# Patient Record
Sex: FEMALE | Race: WHITE | Employment: FULL TIME | URBAN - METROPOLITAN AREA
[De-identification: names, ages, dates, MRNs, and addresses within clinical notes are randomized per-mention and may not be internally consistent; named-entity substitution may affect disease eponyms.]

---

## 2017-05-31 ENCOUNTER — HOSPITAL ENCOUNTER (OUTPATIENT)
Dept: MAMMOGRAPHY | Age: 47
Discharge: HOME | End: 2017-05-31
Payer: COMMERCIAL

## 2017-05-31 DIAGNOSIS — Z12.31 VISIT FOR SCREENING MAMMOGRAM: ICD-10-CM

## 2017-05-31 PROCEDURE — 77067 SCR MAMMO BI INCL CAD: CPT

## 2019-06-03 ENCOUNTER — HOSPITAL ENCOUNTER (OUTPATIENT)
Dept: MAMMOGRAPHY | Age: 49
Discharge: HOME | End: 2019-06-03
Attending: INTERNAL MEDICINE
Payer: COMMERCIAL

## 2019-06-03 DIAGNOSIS — Z12.31 VISIT FOR SCREENING MAMMOGRAM: ICD-10-CM

## 2019-06-03 PROCEDURE — 77067 SCR MAMMO BI INCL CAD: CPT

## 2020-06-07 ENCOUNTER — APPOINTMENT (OUTPATIENT)
Dept: CT IMAGING | Age: 50
DRG: 066 | End: 2020-06-07
Attending: EMERGENCY MEDICINE
Payer: COMMERCIAL

## 2020-06-07 ENCOUNTER — APPOINTMENT (OUTPATIENT)
Dept: MRI IMAGING | Age: 50
DRG: 066 | End: 2020-06-07
Attending: FAMILY MEDICINE
Payer: COMMERCIAL

## 2020-06-07 ENCOUNTER — HOSPITAL ENCOUNTER (INPATIENT)
Age: 50
LOS: 1 days | Discharge: HOME | DRG: 066 | End: 2020-06-08
Attending: EMERGENCY MEDICINE | Admitting: FAMILY MEDICINE
Payer: COMMERCIAL

## 2020-06-07 DIAGNOSIS — R47.01 APHASIA: ICD-10-CM

## 2020-06-07 DIAGNOSIS — I63.9 CEREBROVASCULAR ACCIDENT (CVA), UNSPECIFIED MECHANISM (HCC): Primary | ICD-10-CM

## 2020-06-07 PROBLEM — I63.441 EMBOLIC STROKE INVOLVING RIGHT CEREBELLAR ARTERY (HCC): Status: ACTIVE | Noted: 2020-06-07

## 2020-06-07 PROBLEM — F41.1 GAD (GENERALIZED ANXIETY DISORDER): Status: ACTIVE | Noted: 2020-06-07

## 2020-06-07 PROBLEM — E78.5 HLD (HYPERLIPIDEMIA): Status: ACTIVE | Noted: 2020-06-07

## 2020-06-07 PROBLEM — F32.9 MDD (MAJOR DEPRESSIVE DISORDER): Status: ACTIVE | Noted: 2020-06-07

## 2020-06-07 PROBLEM — I10 HTN (HYPERTENSION): Status: ACTIVE | Noted: 2020-06-07

## 2020-06-07 PROBLEM — E87.6 HYPOKALEMIA: Status: ACTIVE | Noted: 2020-06-07

## 2020-06-07 LAB
ALBUMIN SERPL-MCNC: 3.7 G/DL (ref 3.5–5)
ALBUMIN/GLOB SERPL: 0.9 {RATIO} (ref 1.2–3.5)
ALP SERPL-CCNC: 86 U/L (ref 50–130)
ALT SERPL-CCNC: 26 U/L (ref 12–65)
ANION GAP SERPL CALC-SCNC: 8 MMOL/L (ref 7–16)
AST SERPL-CCNC: 22 U/L (ref 15–37)
BASOPHILS # BLD: 0.1 K/UL (ref 0–0.2)
BASOPHILS NFR BLD: 1 % (ref 0–2)
BILIRUB SERPL-MCNC: 0.8 MG/DL (ref 0.2–1.1)
BUN SERPL-MCNC: 14 MG/DL (ref 6–23)
CALCIUM SERPL-MCNC: 9.1 MG/DL (ref 8.3–10.4)
CHLORIDE SERPL-SCNC: 106 MMOL/L (ref 98–107)
CO2 SERPL-SCNC: 24 MMOL/L (ref 21–32)
CREAT SERPL-MCNC: 0.78 MG/DL (ref 0.6–1)
DIFFERENTIAL METHOD BLD: ABNORMAL
EOSINOPHIL # BLD: 0 K/UL (ref 0–0.8)
EOSINOPHIL NFR BLD: 1 % (ref 0.5–7.8)
ERYTHROCYTE [DISTWIDTH] IN BLOOD BY AUTOMATED COUNT: 12.9 % (ref 11.9–14.6)
GLOBULIN SER CALC-MCNC: 3.9 G/DL (ref 2.3–3.5)
GLUCOSE BLD STRIP.AUTO-MCNC: 119 MG/DL (ref 65–100)
GLUCOSE SERPL-MCNC: 123 MG/DL (ref 65–100)
HCT VFR BLD AUTO: 47.5 % (ref 35.8–46.3)
HGB BLD-MCNC: 15.9 G/DL (ref 11.7–15.4)
IMM GRANULOCYTES # BLD AUTO: 0 K/UL (ref 0–0.5)
IMM GRANULOCYTES NFR BLD AUTO: 0 % (ref 0–5)
INR BLD: 1.1 (ref 0.9–1.2)
INR PPP: 1
LYMPHOCYTES # BLD: 1.6 K/UL (ref 0.5–4.6)
LYMPHOCYTES NFR BLD: 18 % (ref 13–44)
MAGNESIUM SERPL-MCNC: 2 MG/DL (ref 1.8–2.4)
MCH RBC QN AUTO: 32.9 PG (ref 26.1–32.9)
MCHC RBC AUTO-ENTMCNC: 33.5 G/DL (ref 31.4–35)
MCV RBC AUTO: 98.3 FL (ref 79.6–97.8)
MONOCYTES # BLD: 0.8 K/UL (ref 0.1–1.3)
MONOCYTES NFR BLD: 9 % (ref 4–12)
NEUTS SEG # BLD: 6.2 K/UL (ref 1.7–8.2)
NEUTS SEG NFR BLD: 72 % (ref 43–78)
NRBC # BLD: 0 K/UL (ref 0–0.2)
PLATELET # BLD AUTO: 229 K/UL (ref 150–450)
PMV BLD AUTO: 9.3 FL (ref 9.4–12.3)
POTASSIUM SERPL-SCNC: 3.3 MMOL/L (ref 3.5–5.1)
PROT SERPL-MCNC: 7.6 G/DL (ref 6.3–8.2)
PROTHROMBIN TIME: 13.3 SEC (ref 12–14.7)
PT BLD: 13.5 SECS (ref 9.6–11.6)
RBC # BLD AUTO: 4.83 M/UL (ref 4.05–5.2)
SODIUM SERPL-SCNC: 138 MMOL/L (ref 136–145)
TROPONIN-HIGH SENSITIVITY: 8.3 PG/ML (ref 0–14)
WBC # BLD AUTO: 8.7 K/UL (ref 4.3–11.1)

## 2020-06-07 PROCEDURE — 0042T CT PERF W CBF: CPT

## 2020-06-07 PROCEDURE — 74011250637 HC RX REV CODE- 250/637: Performed by: FAMILY MEDICINE

## 2020-06-07 PROCEDURE — 70496 CT ANGIOGRAPHY HEAD: CPT

## 2020-06-07 PROCEDURE — 84484 ASSAY OF TROPONIN QUANT: CPT

## 2020-06-07 PROCEDURE — 99285 EMERGENCY DEPT VISIT HI MDM: CPT

## 2020-06-07 PROCEDURE — 65270000029 HC RM PRIVATE

## 2020-06-07 PROCEDURE — 85610 PROTHROMBIN TIME: CPT

## 2020-06-07 PROCEDURE — 82962 GLUCOSE BLOOD TEST: CPT

## 2020-06-07 PROCEDURE — 93005 ELECTROCARDIOGRAM TRACING: CPT | Performed by: EMERGENCY MEDICINE

## 2020-06-07 PROCEDURE — 85025 COMPLETE CBC W/AUTO DIFF WBC: CPT

## 2020-06-07 PROCEDURE — 74011250637 HC RX REV CODE- 250/637: Performed by: EMERGENCY MEDICINE

## 2020-06-07 PROCEDURE — 70450 CT HEAD/BRAIN W/O DYE: CPT

## 2020-06-07 PROCEDURE — 83735 ASSAY OF MAGNESIUM: CPT

## 2020-06-07 PROCEDURE — 70551 MRI BRAIN STEM W/O DYE: CPT

## 2020-06-07 PROCEDURE — 94762 N-INVAS EAR/PLS OXIMTRY CONT: CPT

## 2020-06-07 PROCEDURE — 74011250636 HC RX REV CODE- 250/636: Performed by: FAMILY MEDICINE

## 2020-06-07 PROCEDURE — 74011000258 HC RX REV CODE- 258

## 2020-06-07 PROCEDURE — 74011000302 HC RX REV CODE- 302: Performed by: FAMILY MEDICINE

## 2020-06-07 PROCEDURE — 74011636320 HC RX REV CODE- 636/320

## 2020-06-07 PROCEDURE — 80053 COMPREHEN METABOLIC PANEL: CPT

## 2020-06-07 PROCEDURE — 86580 TB INTRADERMAL TEST: CPT | Performed by: FAMILY MEDICINE

## 2020-06-07 RX ORDER — SODIUM CHLORIDE 0.9 % (FLUSH) 0.9 %
10 SYRINGE (ML) INJECTION
Status: ACTIVE | OUTPATIENT
Start: 2020-06-07 | End: 2020-06-07

## 2020-06-07 RX ORDER — SODIUM CHLORIDE 0.9 % (FLUSH) 0.9 %
5-40 SYRINGE (ML) INJECTION AS NEEDED
Status: DISCONTINUED | OUTPATIENT
Start: 2020-06-07 | End: 2020-06-08 | Stop reason: HOSPADM

## 2020-06-07 RX ORDER — ENOXAPARIN SODIUM 100 MG/ML
40 INJECTION SUBCUTANEOUS EVERY 24 HOURS
Status: DISCONTINUED | OUTPATIENT
Start: 2020-06-07 | End: 2020-06-08 | Stop reason: HOSPADM

## 2020-06-07 RX ORDER — GUAIFENESIN 100 MG/5ML
324 LIQUID (ML) ORAL
Status: COMPLETED | OUTPATIENT
Start: 2020-06-07 | End: 2020-06-07

## 2020-06-07 RX ORDER — CLONAZEPAM 0.5 MG/1
0.25 TABLET ORAL DAILY PRN
Status: DISCONTINUED | OUTPATIENT
Start: 2020-06-07 | End: 2020-06-08 | Stop reason: HOSPADM

## 2020-06-07 RX ORDER — CLOPIDOGREL BISULFATE 75 MG/1
300 TABLET ORAL ONCE
Status: COMPLETED | OUTPATIENT
Start: 2020-06-07 | End: 2020-06-07

## 2020-06-07 RX ORDER — SODIUM CHLORIDE 0.9 % (FLUSH) 0.9 %
5-40 SYRINGE (ML) INJECTION EVERY 8 HOURS
Status: DISCONTINUED | OUTPATIENT
Start: 2020-06-07 | End: 2020-06-08 | Stop reason: HOSPADM

## 2020-06-07 RX ORDER — CLOPIDOGREL BISULFATE 75 MG/1
300 TABLET ORAL ONCE
Status: CANCELLED | OUTPATIENT
Start: 2020-06-07 | End: 2020-06-07

## 2020-06-07 RX ORDER — SODIUM CHLORIDE, SODIUM LACTATE, POTASSIUM CHLORIDE, CALCIUM CHLORIDE 600; 310; 30; 20 MG/100ML; MG/100ML; MG/100ML; MG/100ML
100 INJECTION, SOLUTION INTRAVENOUS CONTINUOUS
Status: DISCONTINUED | OUTPATIENT
Start: 2020-06-07 | End: 2020-06-08 | Stop reason: HOSPADM

## 2020-06-07 RX ORDER — AMITRIPTYLINE HYDROCHLORIDE 10 MG/1
10 TABLET, FILM COATED ORAL
Status: DISCONTINUED | OUTPATIENT
Start: 2020-06-07 | End: 2020-06-08 | Stop reason: HOSPADM

## 2020-06-07 RX ORDER — ESCITALOPRAM OXALATE 10 MG/1
10 TABLET ORAL DAILY
Status: DISCONTINUED | OUTPATIENT
Start: 2020-06-08 | End: 2020-06-08 | Stop reason: HOSPADM

## 2020-06-07 RX ORDER — ACETAMINOPHEN 650 MG/1
650 SUPPOSITORY RECTAL
Status: DISCONTINUED | OUTPATIENT
Start: 2020-06-07 | End: 2020-06-08 | Stop reason: HOSPADM

## 2020-06-07 RX ORDER — LABETALOL HYDROCHLORIDE 5 MG/ML
5 INJECTION, SOLUTION INTRAVENOUS
Status: DISCONTINUED | OUTPATIENT
Start: 2020-06-07 | End: 2020-06-08 | Stop reason: HOSPADM

## 2020-06-07 RX ORDER — GUAIFENESIN 100 MG/5ML
81 LIQUID (ML) ORAL DAILY
Status: DISCONTINUED | OUTPATIENT
Start: 2020-06-08 | End: 2020-06-08 | Stop reason: HOSPADM

## 2020-06-07 RX ORDER — ACETAMINOPHEN 325 MG/1
650 TABLET ORAL
Status: DISCONTINUED | OUTPATIENT
Start: 2020-06-07 | End: 2020-06-08 | Stop reason: HOSPADM

## 2020-06-07 RX ORDER — CLOPIDOGREL BISULFATE 75 MG/1
75 TABLET ORAL DAILY
Status: DISCONTINUED | OUTPATIENT
Start: 2020-06-08 | End: 2020-06-08 | Stop reason: HOSPADM

## 2020-06-07 RX ORDER — ROSUVASTATIN CALCIUM 20 MG/1
40 TABLET, COATED ORAL
Status: DISCONTINUED | OUTPATIENT
Start: 2020-06-07 | End: 2020-06-08 | Stop reason: HOSPADM

## 2020-06-07 RX ADMIN — ROSUVASTATIN 40 MG: 20 TABLET, FILM COATED ORAL at 21:44

## 2020-06-07 RX ADMIN — TUBERCULIN PURIFIED PROTEIN DERIVATIVE 5 UNITS: 5 INJECTION, SOLUTION INTRADERMAL at 15:53

## 2020-06-07 RX ADMIN — AMITRIPTYLINE HYDROCHLORIDE 10 MG: 10 TABLET, FILM COATED ORAL at 21:44

## 2020-06-07 RX ADMIN — CLOPIDOGREL BISULFATE 300 MG: 75 TABLET ORAL at 12:56

## 2020-06-07 RX ADMIN — Medication 5 ML: at 21:44

## 2020-06-07 RX ADMIN — SODIUM CHLORIDE, SODIUM LACTATE, POTASSIUM CHLORIDE, AND CALCIUM CHLORIDE 100 ML/HR: 600; 310; 30; 20 INJECTION, SOLUTION INTRAVENOUS at 12:30

## 2020-06-07 RX ADMIN — CLONAZEPAM 0.25 MG: 0.5 TABLET ORAL at 15:52

## 2020-06-07 RX ADMIN — ASPIRIN 81 MG 324 MG: 81 TABLET ORAL at 10:14

## 2020-06-07 RX ADMIN — ENOXAPARIN SODIUM 40 MG: 40 INJECTION SUBCUTANEOUS at 12:56

## 2020-06-07 NOTE — PROGRESS NOTES
Called ER and requested RN completion of MRI screening and consent so that STAT MRI may be done as soon as possible.

## 2020-06-07 NOTE — PROGRESS NOTES
Outreach Follow Up Note    Danielle A Deweil was seen and assessed. MEWS Score: 1 (06/07/20 1259)  Vitals:    06/07/20 1030 06/07/20 1100 06/07/20 1131 06/07/20 1259   BP: 122/57 119/68 119/68 132/74   Pulse: (!) 104 97 100 87   Resp: 28 12 20 20   Temp:   98 °F (36.7 °C) 98.5 °F (36.9 °C)   SpO2: 98% 97% 96% 97%   Weight:       Height:             Pain Assessment  Pain Intensity 1: 0 (06/07/20 1131)        Patient Stated Pain Goal: 0      Patient reviewed and discussed with primary nurse. There have been no significant clinical changes since the completion of the last dated Outreach assessment. Will continue to follow up per outreach protocol. Patient is well cared for by primary care nurse who has seen to her needs.       Signed By:   Arsh Jones RN    June 7, 2020 3:49 PM

## 2020-06-07 NOTE — H&P
History of Present Illness:  45-year-old female presenting [facility] emergency department 6/7/2020 with a complaint of difficulty finding words and confusion since approximately noon of 6/6/2020. 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 The etiology of the stroke has not been determined. Patient has had a loop recorder for approximately 1 year without atrial fibrillation or atrial flutter. Patient had a transesophageal echocardiogram in July 2019 at [address] [place] [address] that showed no PFO or thrombus in left atrial appendage. Patient completed a course of 21 days of dual antiplatelet therapy after the stroke in 2019. Patient has been on home aspirin 81 mg and atorvastatin 80 mg and states that she has been compliant with these medications. Patient denies headache, fever, chills, nausea, vomiting, diarrhea, chest pain, shortness of breath. In the emergency department the patient was noted to have normal vital signs. Patient was profoundly aphasic and stat tele-neuro consult was ordered. Patient was out of window for TPA. Interventional neurosurgery was contacted for possible thrombectomy. Interventional neurosurgeon recommended that the patient had a small distal left MCA occlusion that was not amenable to intervention. CT head unremarkable. CT angiogram head and neck read by radiologist as having no occlusive disease. CT perfusion scan indicated a small area of delayed perfusion in the left posterior frontal lobe with no core infarction. MRI brain showing acute left parietal stroke. The patient was given aspirin 325 mg in the emergency department. Comprehensive review of systems negative unless stated above.     Past Medical History:  Right superior cerebellar CVA July 2019  HTN  HLD  MDD  MICKY    Past Surgical History:  Breast augmentation  Laparoscopic gynecologic surgery    Family History:  Unknown    Social History:   in [address],   Never smoker, occasional alcohol use, denies illicit drug use    Physical Exam:  General: Middle-aged white female, lying in bed, no acute distress  HEENT: NCAT, moist mucous membranes  Skin: No rash noted  Cardio: RRR, normal S1/S2, no rubs, no gallops, no murmurs  Pulm: Non labored respirations on room air, LCAB, no wheezing, no rales, no rhonchi  GI: Soft, Nt, Nd, Nml bowel sounds, no masses noted  Extremity: Atraumatic, no deformities, no edema  Neuro: Alert, oriented, speech a phasic, patient comprehending questions cranial nerves intact, 5/5 strength of bilateral upper and lower extremities, sensation intact throughout, no cerebellar signs, normal gait, normal reflexes  Psych: Pleasant, cooperative, normal range of affect    I have personally reviewed all current data for this patient. Assessment and Plan:    #Acute left parietal ischemic CVA, prior right superior cerebellar ischemic CVA: Last known well noon on 6/6/2020. Patient out of window for TPA. Question of distal left MCA occlusive disease not amenable to intervention per interventional neurosurgery. No PFO on prior MIKE. Patient has had loop recorder in place for many months without arrhythmias. I am unable to see whether not the patient has had a hypercoagulable work-up at [facility].   I called tele-neurology and question whether not the patient should be loaded with clopidogrel and continued on aspirin 81 mg plus clopidogrel 75 mg for 21 days and they agreed.  -Admit inpatient  -Consult neurology  -Given aspirin 325 mg at admission, aspirin 81 mg daily indefinitely  -Loaded with clopidogrel 300 mg x 1, clopidogrel 75 mg daily for 21 days thereafter  -Discontinue atorvastatin 80 mg, start rosuvastatin 40 mg  -Permissive hypertension, labetalol IV as needed for SBP greater than 220, DBP greater than 120, hold for HR less than 70  -Hold home metoprolol  -Patient may benefit from repeat MIKE given cryptogenic nature of her ischemic strokes in different vascular distributions at a young age  -Ammonia, ESR, CRP, TSH, hemoglobin A1c, lipid panel, UDS  -Daily labs    #Hypokalemia:  -Replace and trend  -Check magnesium    #HTN: Hold metoprolol  #HLD: As above  #MDD with MICKY: Continue amitriptyline (patient states that she takes escitalopram but I am unclear whether not the patient is mixing up her words because I cannot see in any outpatient record that the patient has ever been on escitalopram, the patient has been previously prescribed duloxetine but most recent record indicates that the patient is only taking amitriptyline and alprazolam), discontinue alprazolam, start as needed clonazepam    Full Code    Inpatient for above    Enoxaparin for VTE prevention    Please call [phone] for questions or concerns

## 2020-06-07 NOTE — ED PROVIDER NOTES
The history is provided by the patient. Altered mental status    This is a new problem. The current episode started yesterday (Yesterday at noon). The problem has not changed since onset. Pertinent negatives include no weakness and no numbness. Associated symptoms comments: Difficulty with speech and getting sentences. Mental status baseline is normal.  Her past medical history is significant for CVA.         Past Medical History:   Diagnosis Date    Psychiatric disorder     anxiety       Past Surgical History:   Procedure Laterality Date    HX BREAST AUGMENTATION      HX GYN      laproscopic    IMPLANT BREAST SILICONE/EQ           Family History:   Problem Relation Age of Onset    Breast Cancer Neg Hx        Social History     Socioeconomic History    Marital status:      Spouse name: Not on file    Number of children: Not on file    Years of education: Not on file    Highest education level: Not on file   Occupational History    Not on file   Social Needs    Financial resource strain: Not on file    Food insecurity     Worry: Not on file     Inability: Not on file    Transportation needs     Medical: Not on file     Non-medical: Not on file   Tobacco Use    Smoking status: Never Smoker    Smokeless tobacco: Never Used   Substance and Sexual Activity    Alcohol use: Yes     Comment: occasional    Drug use: No    Sexual activity: Not on file   Lifestyle    Physical activity     Days per week: Not on file     Minutes per session: Not on file    Stress: Not on file   Relationships    Social connections     Talks on phone: Not on file     Gets together: Not on file     Attends Church service: Not on file     Active member of club or organization: Not on file     Attends meetings of clubs or organizations: Not on file     Relationship status: Not on file    Intimate partner violence     Fear of current or ex partner: Not on file     Emotionally abused: Not on file     Physically abused: Not on file     Forced sexual activity: Not on file   Other Topics Concern    Not on file   Social History Narrative    Not on file         ALLERGIES: Patient has no known allergies. Review of Systems   Constitutional: Negative for chills and fever. HENT: Negative for congestion, rhinorrhea and sore throat. Eyes: Negative for photophobia and redness. Respiratory: Negative for cough and shortness of breath. Cardiovascular: Negative for chest pain and leg swelling. Gastrointestinal: Negative for abdominal pain, diarrhea, nausea and vomiting. Endocrine: Negative for polydipsia and polyuria. Genitourinary: Negative for dysuria, frequency and urgency. Musculoskeletal: Negative for back pain and myalgias. Neurological: Negative for weakness, numbness and headaches. Vitals:    06/07/20 0902   BP: 144/74   Pulse: (!) 102   Resp: 18   Temp: 97.9 °F (36.6 °C)   SpO2: 98%   Weight: 84.8 kg (187 lb)   Height: 5' 9\" (1.753 m)            Physical Exam  Vitals signs and nursing note reviewed. Constitutional:       General: She is not in acute distress. Appearance: She is well-developed. HENT:      Head: Normocephalic. Eyes:      General: No visual field deficit. Pupils: Pupils are equal, round, and reactive to light. Cardiovascular:      Rate and Rhythm: Normal rate and regular rhythm. Heart sounds: Normal heart sounds. Pulmonary:      Effort: Pulmonary effort is normal.      Breath sounds: Normal breath sounds. Abdominal:      General: There is no distension. Palpations: Abdomen is soft. There is no mass. Tenderness: There is no abdominal tenderness. There is no guarding or rebound. Musculoskeletal: Normal range of motion. Lymphadenopathy:      Cervical: No cervical adenopathy. Skin:     General: Skin is warm and dry. Neurological:      Mental Status: She is alert. GCS: GCS eye subscore is 4. GCS verbal subscore is 5. GCS motor subscore is 6. Cranial Nerves: No cranial nerve deficit, dysarthria or facial asymmetry. Sensory: No sensory deficit. Motor: No weakness. Coordination: Coordination normal.      Gait: Gait normal.      Comments: Mild to moderate aphasia present          MDM  Number of Diagnoses or Management Options  Aphasia:   Cerebrovascular accident (CVA), unspecified mechanism [facility]):   Diagnosis management comments: Stroke alert called but patient is not a TPA candidate as the symptoms started yesterday at noon and are well beyond the 4-1/2-hour timeframe. I doubt large vessel occlusion given low NIH score, however the deficit is significant and that it is an aphasia. CTA and perfusion ordered. Dr. Antwan Oneill is awaiting the images and will call back with instruction.        Amount and/or Complexity of Data Reviewed  Clinical lab tests: ordered and reviewed (Results for orders placed or performed during the hospital encounter of 06/07/20  -CBC WITH AUTOMATED DIFF       Result                      Value             Ref Range           WBC                         8.7               4.3 - 11.1 K*       RBC                         4.83              4.05 - 5.2 M*       HGB                         15.9 (H)          11.7 - 15.4 *       HCT                         47.5 (H)          35.8 - 46.3 %       MCV                         98.3 (H)          79.6 - 97.8 *       MCH                         32.9              26.1 - 32.9 *       MCHC                        33.5              31.4 - 35.0 *       RDW                         12.9              11.9 - 14.6 %       PLATELET                    229               150 - 450 K/*       MPV                         9.3 (L)           9.4 - 12.3 FL       ABSOLUTE NRBC               0.00              0.0 - 0.2 K/*       DF                          AUTOMATED                             NEUTROPHILS                 72                43 - 78 %           LYMPHOCYTES                 18                13 - 44 % MONOCYTES                   9                 4.0 - 12.0 %        EOSINOPHILS                 1                 0.5 - 7.8 %         BASOPHILS                   1                 0.0 - 2.0 %         IMMATURE GRANULOCYTES       0                 0.0 - 5.0 %         ABS. NEUTROPHILS            6.2               1.7 - 8.2 K/*       ABS. LYMPHOCYTES            1.6               0.5 - 4.6 K/*       ABS. MONOCYTES              0.8               0.1 - 1.3 K/*       ABS. EOSINOPHILS            0.0               0.0 - 0.8 K/*       ABS. BASOPHILS              0.1               0.0 - 0.2 K/*       ABS. IMM. GRANS.            0.0               0.0 - 0.5 K/*  -METABOLIC PANEL, COMPREHENSIVE       Result                      Value             Ref Range           Sodium                      138               136 - 145 mm*       Potassium                   3.3 (L)           3.5 - 5.1 mm*       Chloride                    106               98 - 107 mmo*       CO2                         24                21 - 32 mmol*       Anion gap                   8                 7 - 16 mmol/L       Glucose                     123 (H)           65 - 100 mg/*       BUN                         14                6 - 23 MG/DL        Creatinine                  0.78              0.6 - 1.0 MG*       GFR est AA                  >60               >60 ml/min/1*       GFR est non-AA              >60               >60 ml/min/1*       Calcium                     9.1               8.3 - 10.4 M*       Bilirubin, total            0.8               0.2 - 1.1 MG*       ALT (SGPT)                  26                12 - 65 U/L         AST (SGOT)                  22                15 - 37 U/L         Alk.  phosphatase            86                50 - 130 U/L        Protein, total              7.6               6.3 - 8.2 g/*       Albumin                     3.7               3.5 - 5.0 g/*       Globulin                    3.9 (H)           2.3 - 3.5 g/* A-G Ratio                   0.9 (L)           1.2 - 3.5      -PROTHROMBIN TIME + INR       Result                      Value             Ref Range           Prothrombin time            13.3              12.0 - 14.7 *       INR                         1.0                              -TROPONIN-HIGH SENSITIVITY       Result                      Value             Ref Range           Troponin-High Sensitiv*     8.3               0 - 14 pg/mL   -GLUCOSE, POC       Result                      Value             Ref Range           Glucose (POC)               119 (H)           65 - 100 mg/*  -POC PT/INR       Result                      Value             Ref Range           Prothrombin time (POC)      13.5 (H)          9.6 - 11.6 S*       INR (POC)                   1.1               0.9 - 1.2      )  Tests in the radiology section of CPT®: ordered and reviewed (Ct Code Neuro Head Wo Contrast    Result Date: 6/7/2020  Head CT INDICATION: Difficulty speaking since yesterday Multiple axial images obtained through the brain without intravenous contrast. Radiation dose reduction techniques were used for this study: All CT scans performed at this facility use one or all of the following: Automated exposure control, adjustment of the mA and/or kVp according to patient's size, iterative reconstruction. FINDINGS: No areas of abnormal attenuation are seen in the brain. There is no CT evidence of acute hemorrhage or infarction. The ventricles are normal in size. There is a CSF density fluid collection posterior to the cerebellum, most likely a abdoulaye cisterna magna. No masses are seen. The sinuses are clear. There are no bony lesions. IMPRESSION: No CT evidence of acute intracranial abnormality.     )  Discuss the patient with other providers: yes (Discussed with Dr. Vasquez Peters and her PA Ms. Moriah Lamb.   Patient has a small distal MCA occlusion but it is not amenable to intervention.)    Risk of Complications, Morbidity, and/or Mortality  Presenting problems: high  Diagnostic procedures: high  Management options: high    Critical Care  Total time providing critical care: (CRITICAL CARE Documentation: This patient is critically ill and there is a high probability of of imminent or life threatening deterioration in the patient's condition without immediate management. The nature of the patient's clinical problem is: Acute cerebrovascular accident    I have spent 30 minutes in direct patient care, documentation, review of labs/xrays/old records, discussion with Staff, patient, family, consutants . The time involved in the performance of separately reportable procedures was not counted toward critical care time.      Celeste Maher MD; 6/7/2020 @10:32 AM    )    Patient Progress  Patient progress: stable         Procedures

## 2020-06-07 NOTE — PROGRESS NOTES
TRANSFER - IN REPORT:    Verbal report received from Jose Mcgee, RN(name) on Leigh Ann Quinones  being received from ER(unit) for routine progression of care      Report consisted of patients Situation, Background, Assessment and   Recommendations(SBAR). Information from the following report(s) SBAR, Kardex, ED Summary, Procedure Summary, Intake/Output and MAR was reviewed with the receiving nurse. Opportunity for questions and clarification was provided. Assessment completed upon patients arrival to unit and care assumed.

## 2020-06-07 NOTE — ED TRIAGE NOTES
Patient arrives after being dropped off by her son. She reports that starting yesterday around noon, she began having trouble expressing herself verbally. She states that she has a history of a ischemic stroke last year. She states that she waited to be seen because she was hoping that the symptoms were going away.

## 2020-06-07 NOTE — PROGRESS NOTES
SPEECH PATHOLOGY NOTE:  Consult received and chart reviewed. Attempted to see patient, but currently in process of transferring to floor. Will try again at a later time/date as schedule permits and patient available.   Delon Mata MA, CCC-SLP

## 2020-06-07 NOTE — PROGRESS NOTES
Patient resting in bed with PIV infusing. Alert and oriented x4. Swallow test normal. Placed on cardiac diet. Cardiac monitor in place box # [id number]. Ambulates to bathroom voiding clear yellow urine. Speech is clear but hard to describe a situation. Bed is low and locked. Call light within reach. Instructed to call for assistance. Verbalizes understanding.

## 2020-06-07 NOTE — ED NOTES
Two attempts made to contact ortho for patient report. Patient is in MRI at this time and MRI staff states that she is going to bring patient to assigned room when the MRI is complete.

## 2020-06-07 NOTE — ED NOTES
Patient has been returned from [address] [place] [address] and reconnected to vital sign monitors.  Patient is now speaking with the tele-neurologist.

## 2020-06-07 NOTE — ED NOTES
TRANSFER - OUT REPORT:    Verbal report given to AMAN Chong on Judd Hopkins  being transferred to 3rd floor for routine progression of care       Report consisted of patients Situation, Background, Assessment and   Recommendations(SBAR). Information from the following report(s) SBAR, ED Summary, [facility] and Cardiac Rhythm NSR was reviewed with the receiving nurse. Lines:   Peripheral IV 06/07/20 Left Antecubital (Active)   Site Assessment Clean, dry, & intact 6/7/2020  9:11 AM   Phlebitis Assessment 0 6/7/2020  9:11 AM   Infiltration Assessment 0 6/7/2020  9:11 AM   Dressing Status Clean, dry, & intact 6/7/2020  9:11 AM   Hub Color/Line Status Green 6/7/2020  9:11 AM   Alcohol Cap Used No 6/7/2020  9:11 AM        Opportunity for questions and clarification was provided.       Patient transported with:   Tech  From [facility]

## 2020-06-08 VITALS
DIASTOLIC BLOOD PRESSURE: 75 MMHG | HEIGHT: 69 IN | SYSTOLIC BLOOD PRESSURE: 113 MMHG | HEART RATE: 95 BPM | OXYGEN SATURATION: 97 % | TEMPERATURE: 98.2 F | WEIGHT: 187 LBS | BODY MASS INDEX: 27.7 KG/M2 | RESPIRATION RATE: 16 BRPM

## 2020-06-08 PROBLEM — I63.412 EMBOLIC STROKE INVOLVING LEFT MIDDLE CEREBRAL ARTERY (HCC): Status: ACTIVE | Noted: 2020-06-08

## 2020-06-08 LAB
AMMONIA PLAS-SCNC: <10 UMOL/L (ref 24.9–68)
AMPHET UR QL SCN: NEGATIVE
ATRIAL RATE: 101 BPM
BARBITURATES UR QL SCN: NEGATIVE
BENZODIAZ UR QL: NEGATIVE
CALCULATED P AXIS, ECG09: 66 DEGREES
CALCULATED R AXIS, ECG10: 36 DEGREES
CALCULATED T AXIS, ECG11: 14 DEGREES
CANNABINOIDS UR QL SCN: NEGATIVE
CHOLEST SERPL-MCNC: 119 MG/DL
COCAINE UR QL SCN: NEGATIVE
CRP SERPL-MCNC: <0.3 MG/DL (ref 0–0.9)
D DIMER PPP FEU-MCNC: 1.81 UG/ML(FEU)
DIAGNOSIS, 93000: NORMAL
ERYTHROCYTE [DISTWIDTH] IN BLOOD BY AUTOMATED COUNT: 12.7 % (ref 11.9–14.6)
ERYTHROCYTE [SEDIMENTATION RATE] IN BLOOD: 10 MM/HR (ref 0–20)
EST. AVERAGE GLUCOSE BLD GHB EST-MCNC: 100 MG/DL
HBA1C MFR BLD: 5.1 %
HCT VFR BLD AUTO: 42.5 % (ref 35.8–46.3)
HDLC SERPL-MCNC: 66 MG/DL (ref 40–60)
HDLC SERPL: 1.8 {RATIO}
HGB BLD-MCNC: 14.4 G/DL (ref 11.7–15.4)
LDLC SERPL CALC-MCNC: 40.4 MG/DL
LIPID PROFILE,FLP: ABNORMAL
MCH RBC QN AUTO: 33 PG (ref 26.1–32.9)
MCHC RBC AUTO-ENTMCNC: 33.9 G/DL (ref 31.4–35)
MCV RBC AUTO: 97.3 FL (ref 79.6–97.8)
METHADONE UR QL: NEGATIVE
MM INDURATION POC: 0 MM (ref 0–5)
NRBC # BLD: 0 K/UL (ref 0–0.2)
OPIATES UR QL: NEGATIVE
P-R INTERVAL, ECG05: 180 MS
PCP UR QL: NEGATIVE
PLATELET # BLD AUTO: 193 K/UL (ref 150–450)
PMV BLD AUTO: 9.3 FL (ref 9.4–12.3)
PPD POC: NEGATIVE NEGATIVE
Q-T INTERVAL, ECG07: 352 MS
QRS DURATION, ECG06: 88 MS
QTC CALCULATION (BEZET), ECG08: 456 MS
RBC # BLD AUTO: 4.37 M/UL (ref 4.05–5.2)
TRIGL SERPL-MCNC: 63 MG/DL (ref 35–150)
TSH SERPL DL<=0.005 MIU/L-ACNC: 3.84 UIU/ML
VENTRICULAR RATE, ECG03: 101 BPM
VLDLC SERPL CALC-MCNC: 12.6 MG/DL (ref 6–23)
WBC # BLD AUTO: 7.3 K/UL (ref 4.3–11.1)

## 2020-06-08 PROCEDURE — 85300 ANTITHROMBIN III ACTIVITY: CPT

## 2020-06-08 PROCEDURE — 83036 HEMOGLOBIN GLYCOSYLATED A1C: CPT

## 2020-06-08 PROCEDURE — 86140 C-REACTIVE PROTEIN: CPT

## 2020-06-08 PROCEDURE — 74011250637 HC RX REV CODE- 250/637: Performed by: FAMILY MEDICINE

## 2020-06-08 PROCEDURE — 97165 OT EVAL LOW COMPLEX 30 MIN: CPT

## 2020-06-08 PROCEDURE — 84443 ASSAY THYROID STIM HORMONE: CPT

## 2020-06-08 PROCEDURE — 85379 FIBRIN DEGRADATION QUANT: CPT

## 2020-06-08 PROCEDURE — 81291 MTHFR GENE: CPT

## 2020-06-08 PROCEDURE — 85652 RBC SED RATE AUTOMATED: CPT

## 2020-06-08 PROCEDURE — 74011250636 HC RX REV CODE- 250/636: Performed by: FAMILY MEDICINE

## 2020-06-08 PROCEDURE — 96125 COGNITIVE TEST BY HC PRO: CPT

## 2020-06-08 PROCEDURE — 85305 CLOT INHIBIT PROT S TOTAL: CPT

## 2020-06-08 PROCEDURE — 36415 COLL VENOUS BLD VENIPUNCTURE: CPT

## 2020-06-08 PROCEDURE — 81240 F2 GENE: CPT

## 2020-06-08 PROCEDURE — 92610 EVALUATE SWALLOWING FUNCTION: CPT

## 2020-06-08 PROCEDURE — 86147 CARDIOLIPIN ANTIBODY EA IG: CPT

## 2020-06-08 PROCEDURE — 81241 F5 GENE: CPT

## 2020-06-08 PROCEDURE — 85027 COMPLETE CBC AUTOMATED: CPT

## 2020-06-08 PROCEDURE — 92523 SPEECH SOUND LANG COMPREHEN: CPT

## 2020-06-08 PROCEDURE — 85306 CLOT INHIBIT PROT S FREE: CPT

## 2020-06-08 PROCEDURE — 86038 ANTINUCLEAR ANTIBODIES: CPT

## 2020-06-08 PROCEDURE — 97535 SELF CARE MNGMENT TRAINING: CPT

## 2020-06-08 PROCEDURE — 99233 SBSQ HOSP IP/OBS HIGH 50: CPT | Performed by: PSYCHIATRY & NEUROLOGY

## 2020-06-08 PROCEDURE — 97161 PT EVAL LOW COMPLEX 20 MIN: CPT

## 2020-06-08 PROCEDURE — 82140 ASSAY OF AMMONIA: CPT

## 2020-06-08 PROCEDURE — 85302 CLOT INHIBIT PROT C ANTIGEN: CPT

## 2020-06-08 PROCEDURE — 80307 DRUG TEST PRSMV CHEM ANLYZR: CPT

## 2020-06-08 PROCEDURE — 83090 ASSAY OF HOMOCYSTEINE: CPT

## 2020-06-08 PROCEDURE — 94762 N-INVAS EAR/PLS OXIMTRY CONT: CPT

## 2020-06-08 PROCEDURE — 80061 LIPID PANEL: CPT

## 2020-06-08 RX ORDER — GUAIFENESIN 100 MG/5ML
81 LIQUID (ML) ORAL DAILY
COMMUNITY

## 2020-06-08 RX ORDER — ROSUVASTATIN CALCIUM 40 MG/1
40 TABLET, COATED ORAL
Qty: 30 TAB | Refills: 1 | Status: SHIPPED | OUTPATIENT
Start: 2020-06-08 | End: 2020-06-19 | Stop reason: ALTCHOICE

## 2020-06-08 RX ORDER — ESCITALOPRAM OXALATE 10 MG/1
10 TABLET ORAL DAILY
COMMUNITY

## 2020-06-08 RX ORDER — CLOPIDOGREL BISULFATE 75 MG/1
75 TABLET ORAL DAILY
Qty: 20 TAB | Refills: 0 | Status: SHIPPED | OUTPATIENT
Start: 2020-06-09 | End: 2020-07-02 | Stop reason: SDUPTHER

## 2020-06-08 RX ORDER — AMITRIPTYLINE HYDROCHLORIDE 10 MG/1
10 TABLET, FILM COATED ORAL
Qty: 30 TAB | Refills: 1 | Status: SHIPPED
Start: 2020-06-08

## 2020-06-08 RX ADMIN — ENOXAPARIN SODIUM 40 MG: 40 INJECTION SUBCUTANEOUS at 11:34

## 2020-06-08 RX ADMIN — ASPIRIN 81 MG 81 MG: 81 TABLET ORAL at 08:25

## 2020-06-08 RX ADMIN — Medication 5 ML: at 05:59

## 2020-06-08 RX ADMIN — CLOPIDOGREL BISULFATE 75 MG: 75 TABLET ORAL at 08:25

## 2020-06-08 RX ADMIN — ESCITALOPRAM OXALATE 10 MG: 10 TABLET ORAL at 08:25

## 2020-06-08 NOTE — PROGRESS NOTES
OUTREACH NURSE PROGRESS REPORT    SUBJECTIVE: In to assess patient secondary to Code S on 6/7/2020. Danielle A Deweil was seen and focused assessment complete. MEWS Score: 1 (06/08/20 6116)  Vitals:    06/08/20 0003 06/08/20 0327 06/08/20 0721 06/08/20 0808   BP: 115/66 114/70 109/63    Pulse: 81 82 84    Resp: 16 16 16    Temp: 98.8 °F (37.1 °C) 97.8 °F (36.6 °C) 98.1 °F (36.7 °C)    SpO2: 97% 96% 98% 93%   Weight:       Height:              OBJECTIVE: On arrival to room, I found patient to be sitting in chair eating. ASSESSMENT:   General appearance: alert, cooperative, no distress, appears stated age  Eyes: negative  Lungs: clear to auscultation bilaterally  Heart: regular rate and rhythm, S1, S2 normal, no murmur, click, rub or gallop  Abdomen: soft, non-tender. Bowel sounds normal. No masses,  no organomegaly  Extremities: extremities normal, atraumatic, no cyanosis or edema  Neurologic: Alert and oriented X 3, normal strength and tone. Normal symmetric reflexes. Normal coordination and gait  Patient states she still has some slurring of speech, but it has improved. None noted by this RN. Pain Assessment  Pain Intensity 1: 0 (06/08/20 0943)        Patient Stated Pain Goal: 0      PLAN:  Patient states she is doing much better than yesterday. No deficits in  or sensation. Pt states she is still having slurred speech but that it has improved from yesterday. Will continue to monitor per policy.

## 2020-06-08 NOTE — PROGRESS NOTES
Problem: Neurolinguistics Impaired (Adult)  Goal: *Speech Goal: (INSERT TEXT)  Description: LTG: Patient will improve neuro-linguistic abilities to increase safety and awareness in functional living environment. STG: Patient will participate in moderate level word finding task with 80% accuracy. STG: Patient will follow 3-step verbal commands with 80% accuracy with minimal assistance. STG: Patient will provide appropriate solutions to problems of daily living with 80% accuracy given min cues. STG: Patient will solve moderate level mathematical problems with 80% accuracy given min cueing. STG: Patient will complete reasoning tasks to improve problem solving and safety awareness with 80% accuracy given min cueing. Outcome: Progressing Towards Goal     SPEECH LANGUAGE PATHOLOGY: DYSPHAGIA AND SPEECH-LANGUAGE/COGNITION: Initial Assessment    NAME/AGE/GENDER: Alfonse Phoenix is a 52 y.o. female  DATE: 6/8/2020  PRIMARY DIAGNOSIS: Expressive aphasia [R47.01]      ICD-10: Treatment Diagnosis: I69.32 Aphasia following Cerebral Infraction    RECOMMENDATIONS   DIET:    continue prescribed diet   PO:  Regular   Liquids:  regular thin    MEDICATIONS: With liquid     ASPIRATION PRECAUTIONS  · Slow rate of intake  · Small bites/sips  · Upright at 90 degrees during meal     COMPENSATORY STRATEGIES/MODIFICATIONS  · None     EDUCATION:  · Recommendations discussed with Patient     CONTINUATION OF SKILLED SERVICES/MEDICAL NECESSITY:   No further swallow intervention currently indicated.  Patient is expected to demonstrate progress in expressive communication, receptive ability and cognitive ability to decrease assistance required communication, increase independence with activities of daily living and return to work.  Patient continues to require skilled intervention due to expressive aphasia and cognitive deficits.       RECOMMENDATIONS for CONTINUED SPEECH THERAPY: YES: Anticipate need for ongoing speech therapy during this hospitalization and at next level of care. ASSESSMENT   Patient presents with oral and pharyngeal phase of swallow within functional limits for all textures assessed. Patient presents with mild-moderate cognitive-linguistic deficits. Speech was intelligible in conversational speech sample 100% of the time. Patient reports word-finding difficulty but drastically improved since onset yesterday. \"I had no words at all yesterday. \"  Patient was administered portions of the Brief Cognitive/Communication Evaluation and the [address]  Status Examination yielding the following results:     Orientation: 100% (5 out of 5 accurate)  Simple Y/N Questions: 100% (5 out of 5 accurate)  Complex Y/N Questions: 100% (5 out of 5 accurate)  1 Step Directions: 100% (5 out of 5 accurate)  2 Step Directions: 80% (4 out of 5 accurate) with patient requiring repetition of command 40% of the time. Errors included perseveration on previous instructions. 3 Step Directions: 20% (1 out of 5 accurate) despite repetition of command. Patient consistently able to perform first 2 steps but not the third.   Object Identification: 828% (5 out of 5 accurate)  Content Questions: 100% (5 out of 5 accurate) with repetition of instruction x1  Automatic Speech: 100%  Confrontation Namin% (5 out of 5 accurate)  Convergent Namin% (4 out of 5 accurate)   Generative Namin in 1 min  Repetition: 100% for words, phrases and sentences (5 out of 5 for each)  Reading accuracy: 100%   Reading comprehension for simple commands: 100% (5 out of 5 accurate)  Delayed Memory: 66% (2 out of 3 after 5 min)  Thought Organization: 100% accurate for steps but with obvious word-finding difficulty and word perseveration  Reasonin% accurate but with word-finding difficulty  Simple Problem Solvin% accurate with word-finding difficulty  Calculation: 60% (3 out of 5 accurate)    Recommend cognitive linguistic intervention focusing on word-finding, mathematical concepts and executive function, and multi-step directions. REHABILITATION POTENTIAL FOR STATED GOALS: Excellent    PLAN    FREQUENCY/DURATION: Continue to follow patient 3 times a week for duration of hospital stay to address above goals. - Recommendations for next treatment session: Next treatment will address cognitive/linguistic therapy    SUBJECTIVE   Patient pleasant and cooperative. She is an . She reports she had a stroke last year and had therapy at [facility]. She reports she would like to have therapy through [facility] after discharge from hospital.  History of Present Injury/Illness: Ms. Sylvia Tejada  has a past medical history of Psychiatric disorder. . She also  has a past surgical history that includes hx gyn; hx breast augmentation; and implant breast silicone/eq. Problem List:  (Impairments causing functional limitations):  1. Cognitive/linguistic deficits    Previous Dysphagia: NONE REPORTED  Diet Prior to Evaluation: regular textures, thin liquids    Orientation:   Person  Place  Time  Situation    Pain: Pain Scale 1: Numeric (0 - 10)  Pain Intensity 1: 0    OBJECTIVE   Oral Motor:   · Labial: No impairment  · Dentition: Natural  · Oral Hygiene: Adequate  · Lingual: No impairment    Swallow evaluation:   Patient consumed trials of items from breakfast tray independently. No overt signs or symptoms of aspiration. Cognitive Linguistic Assessment :  SLUMS      INTERDISCIPLINARY COLLABORATION: Registered Nurse  PRECAUTIONS/ALLERGIES: Patient has no known allergies.      Tool Used: Dysphagia Outcome and Severity Scale (ASTRID)    Score Comments   Normal Diet  [] 7 With no strategies or extra time needed   Functional Swallow  [] 6 May have mild oral or pharyngeal delay   Mild Dysphagia  [] 5 Which may require one diet consistency restricted    Mild-Moderate Dysphagia  [] 4 With 1-2 diet consistencies restricted   Moderate Dysphagia  [] 3 With 2 or more diet consistencies restricted   Moderate-Severe Dysphagia  [] 2 With partial PO strategies (trials with ST only)   Severe Dysphagia  [] 1 With inability to tolerate any PO safely      Score:  Initial: 7 Most Recent: 7 (Date 06/08/20 )   Interpretation of Tool: The Dysphagia Outcome and Severity Scale (ASTRID) is a simple, easy-to-use, 7-point scale developed to systematically rate the functional severity of dysphagia based on objective assessment and make recommendations for diet level, independence level, and type of nutrition. Tool Used: MODIFIED CSASANDRA SCALE (mRS)   Score   No Symptoms  [] 0   No significant disability despite symptoms; able to carry out all usual duties and activities  [] 1   Slight disability; unable to carry out all previous activities but able to look after own affairs without assistance. [] 2   Moderate disability; requiring some help but able to walk without assistance  [] 3   Moderately severe disability; unable to walk without assistance and unable to attend to own bodily needs without assistance  [] 4   Severe disability; bedridden, incontinent, and requiring constant nursing care and attention  [] 5      Score:  Initial: 3    Interpretation of Tool: The Modified Rutherford Scale is a 7-point scaled used to quantify level of disability as it relates to a patient's functional abilities. Current Medications:   No current facility-administered medications on file prior to encounter. Current Outpatient Medications on File Prior to Encounter   Medication Sig Dispense Refill    DULoxetine (CYMBALTA) 30 mg capsule Take 30 mg by mouth daily.  HYDROcodone-acetaminophen (NORCO) 5-325 mg per tablet Take 1 tab by mouth every six hours as needed for pain.   Do not take with other narcotic medicines, do not drive or drink alcohol within 8 hours of taking this medication 12 Tab 0       SAFETY:  After treatment position/precautions:  · Up in chair    Total Treatment Duration:   Time In: 0845  Time Out: [address], [place], CCC-SLP

## 2020-06-08 NOTE — DISCHARGE SUMMARY
Discharge Summary     Patient: Giovani Jackson MRN: [id number]  SSN: [id number]    YOB: 1970  Age: 52 y.o. Sex: female       Admit Date: 6/7/2020    Discharge Date: 6/8/2020      Admission Diagnoses: Expressive aphasia [R47.01]    Discharge Diagnoses:   Problem List as of 6/8/2020 Never Reviewed          Codes Class Noted - Resolved    * (Principal) Embolic stroke involving left middle cerebral artery ([facility] [address] ICD-10-CM: N58.422  ICD-9-CM: 434.11  6/8/2020 - Present        Expressive aphasia ICD-10-CM: R47.01  ICD-9-CM: 784.3  6/7/2020 - Present        Embolic stroke involving right cerebellar artery ([facility] [address] ICD-10-CM: V49.556  ICD-9-CM: 434.11  6/7/2020 - Present        HTN (hypertension) ICD-10-CM: I10  ICD-9-CM: 401.9  6/7/2020 - Present        HLD (hyperlipidemia) ICD-10-CM: E78.5  ICD-9-CM: 272.4  6/7/2020 - Present        MDD (major depressive disorder) ICD-10-CM: F32.9  ICD-9-CM: 296.20  6/7/2020 - Present        MICKY (generalized anxiety disorder) ICD-10-CM: F41.1  ICD-9-CM: 300.02  6/7/2020 - Present        Hypokalemia ICD-10-CM: E87.6  ICD-9-CM: 276.8  6/7/2020 - Present               Discharge Condition: Stable    Hospital Course:   40-year-old female presenting [address] [place] [address] emergency department 6/7/2020 with a complaint of difficulty finding words and confusion since approximately noon of 6/6/2020. 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 The etiology of the stroke has not been determined. Patient has had a loop recorder for approximately 1 year without atrial fibrillation or atrial flutter. Patient had a transesophageal echocardiogram in July 2019 at [facility] that showed no PFO or thrombus in left atrial appendage. Patient completed a course of 21 days of dual antiplatelet therapy after the stroke in 2019.   Patient has been on home aspirin 81 mg and atorvastatin 80 mg and states that she has been compliant with these medications. Patient denies headache, fever, chills, nausea, vomiting, diarrhea, chest pain, shortness of breath. In the emergency department the patient was noted to have normal vital signs. Patient was profoundly aphasic and stat tele-neuro consult was ordered. Patient was out of window for TPA. Interventional neurosurgery was contacted for possible thrombectomy. Interventional neurosurgeon recommended that the patient had a small distal left MCA occlusion that was not amenable to intervention. CT head unremarkable. CT angiogram head and neck read by radiologist as having no occlusive disease. CT perfusion scan indicated a small area of delayed perfusion in the left posterior frontal lobe with no core infarction. MRI brain showing acute left parietal stroke. The patient was given aspirin 325 mg in the emergency department. Discussed case with tele-neurologist over the phone who recommended dual antiplatelet therapy for 21 days. On the morning after admission the patient thankfully had marked improvement of her expressive aphasia but was not back to her baseline speech. No receptive aphasia. Patient has no other focal neurologic deficits. Neurology evaluated the patient and do not believe that she requires repeat MIKE. Neurology ordered multiple blood test to assess for autoimmune and hematologic causes. Patient is to follow-up with outpatient neurology after discharge. Loop recorder interrogation without atrial fibrillation or atrial flutter. Speech therapy recommend continued outpatient therapy. Discharge instructions were discussed at length with patient and she voiced understanding and agreement. Patient given strict return precautions for which he voiced understanding.     Physical Exam:   General: Middle-aged white female, lying in bed, no acute distress  HEENT: NCAT, moist mucous membranes  Skin: No rash noted  Cardio: RRR, normal S1/S2, no rubs, no gallops, no murmurs  Pulm: Non labored respirations on room air, LCAB, no wheezing, no rales, no rhonchi  GI: Soft, Nt, Nd, Nml bowel sounds, no masses noted  Extremity: Atraumatic, no deformities, no edema  Neuro: Alert, oriented, improved expressive aphasia, patient comprehending questions cranial nerves intact, 5/5 strength of bilateral upper and lower extremities, sensation intact throughout, no cerebellar signs, normal gait, normal reflexes  Psych: Pleasant, cooperative, normal range of affect    Consults: Neurology    Significant Diagnostic Studies:     Head CT     INDICATION: Difficulty speaking since yesterday     Multiple axial images obtained through the brain without intravenous contrast.   Radiation dose reduction techniques were used for this study: All CT scans  performed at this facility use one or all of the following: Automated exposure  control, adjustment of the mA and/or kVp according to patient's size, iterative  reconstruction.     FINDINGS: No areas of abnormal attenuation are seen in the brain. There is no CT  evidence of acute hemorrhage or infarction. The ventricles are normal in size. There is a CSF density fluid collection posterior to the cerebellum, most likely  a abdoulaye cisterna magna. No masses are seen. The sinuses are clear. There are no  bony lesions.     IMPRESSION  IMPRESSION: No CT evidence of acute intracranial abnormality. Head Perfusion Imaging     INDICATION:  Difficulty speaking     CT perfusion imaging of the brain was then performed after the administration of  intravenous contrast.  Perfusion maps and perfusion analysis output were  generated using the VIZ perfusion processing software algorithm. Radiation  dose reduction techniques were used for this study:   All CT scans performed at  this facility use one or all of the following: Automated exposure control,  adjustment of the mA and/or kVp according to patient's size, iterative  reconstruction.     FINDINGS: There is delayed perfusion of the posterior left frontal lobe.    VIZ Output Values:      CBF < 30% volume:  0 ml   (core infarction volume greater than 50 cc associated  with poor outcomes)  Tmax > 6 seconds: 22 ml  Tmax/CBF Mismatch Volume: 22 ml  Tmax/CBF Mismatch Ratio: Not applicable  Hypoperfusion Intensity Ratio (Tmax10/Tmax6): 0   (values greater than 0.5  associated with poor outcome)  Tmax > 10 seconds Volume: 0 ml   (volume greater than 100 mL is associated with  poor outcome)     IMPRESSION  IMPRESSION: Small area of delayed perfusion in the posterior left frontal lobe. No core infarct seen. History: Code stroke expressive aphasia.     FINDINGS:     CT angiography was performed of the neck and head with contrast and  three-dimensional CT angiography reconstruction and reformat was performed. NASCET criteria as needed. CT dose reduction was achieved through use of a  standardized protocol tailored for this examination and automatic exposure  control for dose modulation.      Aortic arch is patent. Subclavian arteries are patent bilaterally. The left  vertebral artery is patent. The right vertebral artery is patent. The left  common carotid artery is patent. The left internal carotid artery is widely  patent. The right common and internal carotid arteries are patent. The basilar  artery is patent. Hypoplastic P1 segments bilaterally. The posterior  communicating arteries provide the dominant flow to the posterior cerebral  arteries bilaterally.     The anterior cerebral arteries are patent. The middle cerebral arteries are  patent bilaterally. Posterior cerebral arteries are patent bilaterally. Dural  venous sinuses are patent.     IMPRESSION  IMPRESSION:     No acute arterial occlusive disease is identified. No hemodynamically  significant carotid artery stenosis.     MRI of the brain      INDICATION: Acute onset aphasia     Standard MRI sequences were obtained through the brain in multiple planes  without IV contrast     FINDINGS:  There is restricted diffusion in the posterior left frontal lobe and anterior  left parietal lobe consistent with acute infarct. This correlates with the area  of delayed perfusion on the CT. There is no associated hemorrhage. There are  no other areas of acute infarction. There is mild chronic change in white  matter. .    The ventricles are normal in size. There is a fluid collection  posterior to the cerebellum, likely negative cisterna magna. There are no  significant intracranial masses. There are no bony lesions. The sinuses are  clear.     IMPRESSION  IMPRESSION: Acute left parietal infarct    Disposition: Home with outpatient speech therapy    Discharge Medications:   Current Discharge Medication List      START taking these medications    Details   amitriptyline (ELAVIL) 10 mg tablet Take 1 Tab by mouth nightly as needed for Sleep. Qty: 30 Tab, Refills: 1      clopidogreL (PLAVIX) 75 mg tab Take 1 Tab by mouth daily. Qty: 20 Tab, Refills: 0      rosuvastatin (CRESTOR) 40 mg tablet Take 1 Tab by mouth nightly. Qty: 30 Tab, Refills: 1         CONTINUE these medications which have NOT CHANGED    Details   escitalopram oxalate (Lexapro) 10 mg tablet Take 10 mg by mouth daily. aspirin 81 mg chewable tablet Take 81 mg by mouth daily. HYDROcodone-acetaminophen (NORCO) 5-325 mg per tablet Take 1 tab by mouth every six hours as needed for pain.   Do not take with other narcotic medicines, do not drive or drink alcohol within 8 hours of taking this medication  Qty: 12 Tab, Refills: 0         STOP taking these medications       DULoxetine (CYMBALTA) 30 mg capsule Comments:   Reason for Stopping:               Activity: Activity as tolerated  Diet: Cardiac Diet  Wound Care: None needed    Follow-up Appointments   Procedures    FOLLOW UP VISIT Appointment in: Two Weeks Dr. Bea Escalera of Neurology     Dr. Bea Escalera of Neurology     Standing Status:   Standing     Number of Occurrences:   1     Order Specific Question:   Appointment in     Answer:    Two Weeks       Signed By: Marbella Spain MD     June 8, 2020

## 2020-06-08 NOTE — CONSULTS
Consult    Patient: Cora Myers MRN: [id number]     YOB: 1970  Age: 52 y.o. Sex: female      Subjective:      Cora Myers is a 52 y.o. female who is being seen for stroke. The patient has a history of cryptogenic stroke. She had a transesophageal echocardiogram which was normal.  No patent wong ovale. No hyperlipidemia, hypertension, diabetes, or other traditional risk factors for stroke. No family history of stroke. She presented to the hospital with acute onset expressive aphasia. MRI reveals a left parietal lobe infarct. She was not a candidate for intervention.     Past Medical History:   Diagnosis Date    Psychiatric disorder     anxiety     Past Surgical History:   Procedure Laterality Date    HX BREAST AUGMENTATION      HX GYN      laproscopic    IMPLANT BREAST SILICONE/EQ        Family History   Problem Relation Age of Onset    Breast Cancer Neg Hx      Social History     Tobacco Use    Smoking status: Never Smoker    Smokeless tobacco: Never Used   Substance Use Topics    Alcohol use: Yes     Comment: occasional      Current Facility-Administered Medications   Medication Dose Route Frequency Provider Last Rate Last Dose    sodium chloride (NS) flush 5-40 mL  5-40 mL IntraVENous Q8H Allan Jolley MD   5 mL at 06/08/20 0559    sodium chloride (NS) flush 5-40 mL  5-40 mL IntraVENous PRN Allan Jolley MD        lactated Ringers infusion  100 mL/hr IntraVENous CONTINUOUS Allan Jolley  mL/hr at 06/07/20 1230 100 mL/hr at 06/07/20 1230    aspirin chewable tablet 81 mg  81 mg Oral DAILY Allan Jolley MD   81 mg at 06/08/20 0825    rosuvastatin (CRESTOR) tablet 40 mg  40 mg Oral QHS Allan Jolley MD   40 mg at 06/07/20 2144    labetaloL (NORMODYNE;TRANDATE) injection 5 mg  5 mg IntraVENous Q10MIN PRN Chris Jolley MD        acetaminophen (TYLENOL) tablet 650 mg  650 mg Oral Q4H PRN Cally Pinto MD       [address] [place] [address] acetaminophen (TYLENOL) suppository 650 mg  650 mg Rectal Q4H PRN Allan Jolley MD        enoxaparin (LOVENOX) injection 40 mg  40 mg SubCUTAneous Q24H Allan Jolley MD   40 mg at 06/08/20 1134    clonazePAM (KlonoPIN) tablet 0.25 mg  0.25 mg Oral DAILY PRN Tomasz Archibald MD   0.25 mg at 06/07/20 1552    amitriptyline (ELAVIL) tablet 10 mg  10 mg Oral QHS Allan Jolley MD   10 mg at 06/07/20 2144    clopidogreL (PLAVIX) tablet 75 mg  75 mg Oral DAILY Allan Jolley MD   75 mg at 06/08/20 0825    escitalopram oxalate (LEXAPRO) tablet 10 mg  10 mg Oral DAILY Tomasz Archibald MD   10 mg at 06/08/20 0825        No Known Allergies    Review of Systems:  CONSTITUTIONAL: No weight loss, fever, chills, weakness or fatigue. HEENT: Eyes: No visual loss, blurred vision, double vision or yellow sclerae. Ears, Nose, Throat: No hearing loss, sneezing, congestion, runny nose or sore throat. SKIN: No rash or itching. CARDIOVASCULAR: No chest pain, chest pressure or chest discomfort. No palpitations or edema. RESPIRATORY: No shortness of breath, cough or sputum. GASTROINTESTINAL: No anorexia, nausea, vomiting or diarrhea. No abdominal pain or blood. GENITOURINARY: no burning with urination. NEUROLOGICAL: No headache, dizziness, syncope, paralysis, ataxia, numbness or tingling in the extremities. No change in bowel or bladder control. MUSCULOSKELETAL: No muscle, back pain, joint pain or stiffness. HEMATOLOGIC: No anemia, bleeding or bruising. LYMPHATICS: No enlarged nodes. No history of splenectomy. PSYCHIATRIC: No history of depression or anxiety. ENDOCRINOLOGIC: No reports of sweating, cold or heat intolerance. No polyuria or polydipsia. ALLERGIES: No history of asthma, hives, eczema or rhinitis.         Objective:     Vitals:    06/08/20 0327 06/08/20 0721 06/08/20 0808 06/08/20 1113   BP: 114/70 109/63  122/74   Pulse: 82 84  92   Resp: 16 16  16   Temp: 97.8 °F (36.6 °C) 98.1 °F (36.7 °C)  97.6 °F (36.4 °C)   SpO2: 96% 98% 93% 95%   Weight:       Height:            Physical Exam:  General - Well developed, well nourished, in no apparent distress. Pleasant and conversant. HEENT - Normocephalic, atraumatic. Neck -No masses   Lungs - Normal work of breathing   Abdomen - No masses   Extremities - No edema and no rashes. Psychiatric - Mood and affect are normal    Neurological examination - Comprehension, attention , memory and reasoning are intact. She has very mild expressive aphasia. Speech is normal.  On cranial nerve examination pupils are equal round and reactive to light (cranial nerve II and III). Visual acuity is adequate (cranial nerve II). Visual fields are full to finger confrontation (CN II). Extraocular motility is normal (CN III, IV, VI). Face is symmetric (CN VII). Hearing is grossly intact to verbal communication (CN VIII). Motor examination - There is normal bulk. Power is full throughout (with spontaneous movement against environmental objects). Cerebellar examination is normal with finger-no-nose testing. Gait and stance are normal.       Lab Results   Component Value Date/Time    Cholesterol, total 119 06/08/2020 03:26 AM    HDL Cholesterol 66 (H) 06/08/2020 03:26 AM    LDL, calculated 40.4 06/08/2020 03:26 AM    VLDL, calculated 12.6 06/08/2020 03:26 AM    Triglyceride 63 06/08/2020 03:26 AM    CHOL/HDL Ratio 1.8 06/08/2020 03:26 AM        Lab Results   Component Value Date/Time    Hemoglobin A1c 5.1 06/08/2020 03:26 AM        CT Results (most recent): Personally Reviewed   Results from Hospital Encounter encounter on 06/07/20   CT PERF W CBF    Narrative Head Perfusion Imaging    INDICATION:  Difficulty speaking    CT perfusion imaging of the brain was then performed after the administration of  intravenous contrast.  Perfusion maps and perfusion analysis output were  generated using the VIZ perfusion processing software algorithm.    Radiation  dose reduction techniques were used for this study: All CT scans performed at  this facility use one or all of the following: Automated exposure control,  adjustment of the mA and/or kVp according to patient's size, iterative  reconstruction. FINDINGS: There is delayed perfusion of the posterior left frontal lobe. VIZ Output Values:     CBF < 30% volume:  0 ml   (core infarction volume greater than 50 cc associated  with poor outcomes)  Tmax > 6 seconds: 22 ml  Tmax/CBF Mismatch Volume: 22 ml  Tmax/CBF Mismatch Ratio: Not applicable  Hypoperfusion Intensity Ratio (Tmax10/Tmax6): 0   (values greater than 0.5  associated with poor outcome)  Tmax > 10 seconds Volume: 0 ml   (volume greater than 100 mL is associated with  poor outcome)      Impression IMPRESSION: Small area of delayed perfusion in the posterior left frontal lobe. No core infarct seen. Results for orders placed or performed during the hospital encounter of 06/07/20   EKG, 12 LEAD, INITIAL   Result Value Ref Range    Ventricular Rate 101 BPM    Atrial Rate 101 BPM    P-R Interval 180 ms    QRS Duration 88 ms    Q-T Interval 352 ms    QTC Calculation (Bezet) 456 ms    Calculated P Axis 66 degrees    Calculated R Axis 36 degrees    Calculated T Axis 14 degrees    Diagnosis       Sinus tachycardia  Possible Lateral infarct , age undetermined  Cannot rule out Inferior infarct , age undetermined  Abnormal ECG  No previous ECGs available  Confirmed by RYLAN MAZARIEGOS (), Jaleesa Estrada ([id number]) on 6/8/2020 6:23:48 AM          MRI Results (most recent): Personally Reviewed  Results from [facility] encounter on 06/07/20   MRI BRAIN WO CONT    Narrative MRI of the brain     INDICATION: Acute onset aphasia    Standard MRI sequences were obtained through the brain in multiple planes  without IV contrast    FINDINGS:  There is restricted diffusion in the posterior left frontal lobe and anterior  left parietal lobe consistent with acute infarct.   This correlates with the area  of delayed perfusion on the CT. There is no associated hemorrhage. There are  no other areas of acute infarction. There is mild chronic change in white  matter. .    The ventricles are normal in size. There is a fluid collection  posterior to the cerebellum, likely negative cisterna magna. There are no  significant intracranial masses. There are no bony lesions. The sinuses are  clear. Impression IMPRESSION: Acute left parietal infarct              Most recent MRA  Results from [facility] encounter on 06/07/20   MRI BRAIN WO CONT    Narrative MRI of the brain     INDICATION: Acute onset aphasia    Standard MRI sequences were obtained through the brain in multiple planes  without IV contrast    FINDINGS:  There is restricted diffusion in the posterior left frontal lobe and anterior  left parietal lobe consistent with acute infarct. This correlates with the area  of delayed perfusion on the CT. There is no associated hemorrhage. There are  no other areas of acute infarction. There is mild chronic change in white  matter. .    The ventricles are normal in size. There is a fluid collection  posterior to the cerebellum, likely negative cisterna magna. There are no  significant intracranial masses. There are no bony lesions. The sinuses are  clear. Impression IMPRESSION: Acute left parietal infarct           Assessment:     Embolic stroke of undetermined source (ESUS). This is her second admission from cryptogenic stroke. She does not have traditional risk factors. To my understanding, the patient has a loop recorder and atrial fibrillation has not been detected. MIKE normal per documentation. Uncommon causes of stroke need to be investigated including autoimmune, genetic, and hematological causes. I have sent various labs to investigate these things further. I agree with dual antiplatelet therapy for the time being.     I recommend the patient following up with me as an outpatient to review extensive labs. If these are negative then we may need to send the patient for genetic testing.     Plan:     As above       Signed By: Makayla Green,      June 8, 2020

## 2020-06-08 NOTE — PROGRESS NOTES
Problem: Falls - Risk of  Goal: *Absence of Falls  Description: Document Dorothy Arredondo Fall Risk and appropriate interventions in the flowsheet.   Outcome: Progressing Towards Goal  Note: Fall Risk Interventions:  Mobility Interventions: Bed/chair exit alarm    Mentation Interventions: Bed/chair exit alarm    Medication Interventions: Bed/chair exit alarm    Elimination Interventions: Bed/chair exit alarm              Problem: TIA/CVA Stroke: 0-24 hours  Goal: Activity/Safety  Outcome: Progressing Towards Goal  Goal: Consults, if ordered  Outcome: Progressing Towards Goal  Goal: Diagnostic Test/Procedures  Outcome: Progressing Towards Goal  Goal: Nutrition/Diet  Outcome: Progressing Towards Goal  Goal: Discharge Planning  Outcome: Progressing Towards Goal  Goal: Medications  Outcome: Progressing Towards Goal  Goal: Respiratory  Outcome: Progressing Towards Goal  Goal: Treatments/Interventions/Procedures  Outcome: Progressing Towards Goal  Goal: Minimize risk of bleeding post-thrombolytic infusion  Outcome: Progressing Towards Goal  Goal: Monitor for complications post-thrombolytic infusion  Outcome: Progressing Towards Goal  Goal: Psychosocial  Outcome: Progressing Towards Goal  Goal: *Hemodynamically stable  Outcome: Progressing Towards Goal  Goal: *Neurologically stable  Description: Absence of additional neurological deficits    Outcome: Progressing Towards Goal  Goal: *Verbalizes anxiety and depression are reduced or absent  Outcome: Progressing Towards Goal  Goal: *Absence of Signs of Aspiration on Current Diet  Outcome: Progressing Towards Goal  Goal: *Absence of deep venous thrombosis signs and symptoms(Stroke Metric)  Outcome: Progressing Towards Goal  Goal: *Ability to perform ADLs and demonstrates progressive mobility and function  Outcome: Progressing Towards Goal  Goal: *Stroke education started(Stroke Metric)  Outcome: Progressing Towards Goal  Goal: *Dysphagia screen performed(Stroke Metric)  Outcome: Progressing Towards Goal  Goal: *Rehab consulted(Stroke Metric)  Outcome: Progressing Towards Goal

## 2020-06-08 NOTE — PROGRESS NOTES
Shift assessment complete. Pt resting in bed. A&Ox4. Neuro checks complete. Respirations present, even, unlabored. Lung sounds clear to auscultation. HR regular, S1&S2 auscultated. Tele box in place #[id number]. IV capped and patent. Pt denies pain at this time. Bed in lowest position, call light within reach, side rails x3. Encouraged to call for help when needed.

## 2020-06-08 NOTE — PROGRESS NOTES
OUTREACH NURSE PROGRESS REPORT    SUBJECTIVE: In to assess patient secondary to follow up on CVA admit from ER. Danielle A Deweil was seen and focused assessment complete. MEWS Score: 1 (06/07/20 2028)  Vitals:    06/07/20 1131 06/07/20 1259 06/07/20 1951 06/07/20 2028   BP: 119/68 132/74  128/84   Pulse: 100 87  86   Resp: 20 20  18   Temp: 98 °F (36.7 °C) 98.5 °F (36.9 °C)  99 °F (37.2 °C)   SpO2: 96% 97% 97% 97%   Weight:       Height:              OBJECTIVE: On arrival to room, I found patient to be seated in bed, fully alert/oriented. ASSESSMENT:   Visit Vitals  /84   Pulse 86   Temp 99 °F (37.2 °C)   Resp 18   Ht 5' 9\" (1.753 m)   Wt 84.8 kg (187 lb)   SpO2 97%   BMI 27.62 kg/m²     General:  Alert, cooperative, no distress. Head:  Normocephalic, without obvious abnormality, atraumatic. Eyes:  Conjunctivae/corneas clear. Pupils equal, round, reactive to light. Extraocular movements intact. Lungs:   Clear to auscultation bilaterally. Chest wall:  No tenderness or deformity. Heart:  Regular rate and rhythm, S1, S2 normal, no murmur, click, rub, or gallop. Abdomen:   Soft, non-tender. Bowel sounds normal. No masses. No organomegaly. Extremities: Extremities normal, atraumatic, no cyanosis or edema. Pulses: 2+ and symmetric all extremities. Skin: Skin color, texture, turgor normal. No rashes or lesions. Lymph nodes: Cervical, supraclavicular, and axillary nodes normal.   Neurologic: CNII-XII intact. Normal strength, sensation, and reflexes throughout. Patient with no apparent facial droop, pupils PERRLA, no visual deficits. Patient with good bilateral strength with no deficits. Patient with slight slurring of speech- patient endorses slightly different than normal but reports feeling as if it has improved since admission. Pain Assessment  Pain Intensity 1: 0 (06/07/20 1131)        Patient Stated Pain Goal: 0      PLAN:  Continue to monitor per outreach protocols. Discussed with primary nurse who has no concerns at current and is aware outreach available with any needs.

## 2020-06-08 NOTE — PROGRESS NOTES
Outreach Follow Up Note    Danielle A Deweil was seen and assessed. MEWS Score: 1 (06/08/20 1113)  Vitals:    06/08/20 0327 06/08/20 0721 06/08/20 0808 06/08/20 1113   BP: 114/70 109/63  122/74   Pulse: 82 84  92   Resp: 16 16  16   Temp: 97.8 °F (36.6 °C) 98.1 °F (36.7 °C)  97.6 °F (36.4 °C)   SpO2: 96% 98% 93% 95%   Weight:       Height:             Pain Assessment  Pain Intensity 1: 0 (06/08/20 1000)        Patient Stated Pain Goal: 0      Patient reviewed and discussed with primary nurse. There have been no significant clinical changes since the completion of the last dated Outreach assessment. Will continue to follow up per outreach protocol.     Signed By:   Roshan Goncalves RN    June 8, 2020 4:13 PM

## 2020-06-08 NOTE — PROGRESS NOTES
Updated family on current medication list and plan of care via mobile phone. Patient up eating in bed. No s/sx of distress noted. Will continue to monitor per shift.

## 2020-06-08 NOTE — DISCHARGE INSTRUCTIONS

## 2020-06-08 NOTE — PROGRESS NOTES
Outreach Follow Up Note          MEWS Score: 1 (06/08/20 0327)  Vitals:    06/07/20 1951 06/07/20 2028 06/08/20 0003 06/08/20 0327   BP:  128/84 115/66 114/70   Pulse:  86 81 82   Resp:  18 16 16   Temp:  99 °F (37.2 °C) 98.8 °F (37.1 °C) 97.8 °F (36.6 °C)   SpO2: 97% 97% 97% 96%   Weight:       Height:             Pain Assessment  Pain Intensity 1: 0 (06/07/20 1131)        Patient Stated Pain Goal: 0      Patient reviewed and discussed with primary nurse. There have been no significant clinical changes since the completion of the last dated Outreach assessment. Primary RN advises improvement in aphagia, speaking nearly normal at last check. Patient with recent ambulation to bathroom without assistance. Primary RN reports no focal deficits, visual field deficits, any other concerns. As patient is resting at current and with positive report from primary RN, allowed to sleep without interruption. Will check back in later in the morning when patient is awake. Primary RN reports no concerns and advises she'll contact outreach with any needs/changes in patient status. Will continue to follow up per outreach protocol. Thanks for allowing the outreach team to help in your care of the patient. Signed By:   Sandra Mclaughlin RN    June 8, 2020 4:52 AM     ADDENDUM 06/8/2020 0620AM    Back to room- patient awake, alert and oriented. Patient with = , - arm sway, - facial droop. Patient with = sensation to face, trunk, arms, legs. Patient with good strength bilaterally. Patient with no focal deficits. Marked improvement in slurred speech noted from first visit with patient-  slurring barely perceivable at current. Patient endorses feeling like she is speaking close to her baseline. Patient denies any discomfort or needs at present. Rechecked with primary RN, no new needs or concerns expressed. As always, outreach remains available with any needs.

## 2020-06-08 NOTE — PROGRESS NOTES
History of Present Illness/Hospital Course:  80-year-old female presenting [facility] emergency department 6/7/2020 with a complaint of difficulty finding words and confusion since approximately noon of 6/6/2020. 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 The etiology of the stroke has not been determined. Patient has had a loop recorder for approximately 1 year without atrial fibrillation or atrial flutter. Patient had a transesophageal echocardiogram in July 2019 at [facility] that showed no PFO or thrombus in left atrial appendage. Patient completed a course of 21 days of dual antiplatelet therapy after the stroke in 2019. Patient has been on home aspirin 81 mg and atorvastatin 80 mg and states that she has been compliant with these medications. Patient denies headache, fever, chills, nausea, vomiting, diarrhea, chest pain, shortness of breath. In the emergency department the patient was noted to have normal vital signs. Patient was profoundly aphasic and stat tele-neuro consult was ordered. Patient was out of window for TPA. Interventional neurosurgery was contacted for possible thrombectomy. Interventional neurosurgeon recommended that the patient had a small distal left MCA occlusion that was not amenable to intervention. CT head unremarkable. CT angiogram head and neck read by radiologist as having no occlusive disease. CT perfusion scan indicated a small area of delayed perfusion in the left posterior frontal lobe with no core infarction. MRI brain showing acute left parietal stroke. The patient was given aspirin 325 mg in the emergency department. Today: Discussed case with tele-neurologist over the phone who recommended dual antiplatelet therapy for 21 days. Thankfully the patient does have improvement in expressive aphasia today. Still no other focal neurologic deficits.   I have consulted neurology so that they can further assist in determining the etiology of the patient's repeat ischemic stroke, specifically whether not the patient would benefit from repeat MIKE. Loop recorder interrogation pending. PT/OT/ST evaluation pending. The patient will likely need hypercoagulable disorder work-up as an outpatient. Comprehensive review of systems negative unless stated above. Past Medical History:  Right superior cerebellar CVA July 2019  HTN  HLD  MDD  [facility]    Past Surgical History:  Breast augmentation  Laparoscopic gynecologic surgery    Family History:  Unknown    Social History:   in [address],   Never smoker, occasional alcohol use, denies illicit drug use    Physical Exam:  General: Middle-aged white female, lying in bed, no acute distress  HEENT: NCAT, moist mucous membranes  Skin: No rash noted  Cardio: RRR, normal S1/S2, no rubs, no gallops, no murmurs  Pulm: Non labored respirations on room air, LCAB, no wheezing, no rales, no rhonchi  GI: Soft, Nt, Nd, Nml bowel sounds, no masses noted  Extremity: Atraumatic, no deformities, no edema  Neuro: Alert, oriented, improved expressive aphasia, patient comprehending questions cranial nerves intact, 5/5 strength of bilateral upper and lower extremities, sensation intact throughout, no cerebellar signs, normal gait, normal reflexes  Psych: Pleasant, cooperative, normal range of affect    I have personally reviewed all current data for this patient. Assessment and Plan:    #Acute left parietal ischemic CVA, prior right superior cerebellar ischemic CVA: Last known well noon on 6/6/2020. Patient out of window for TPA. Question of distal left MCA occlusive disease not amenable to intervention per interventional neurosurgery. No PFO on prior MIKE. Patient has had loop recorder in place for many months without arrhythmias. I am unable to see whether not the patient has had a hypercoagulable work-up at [facility]. I called tele-neurology and question whether not the patient should be loaded with clopidogrel and continued on aspirin 81 mg plus clopidogrel 75 mg for 21 days and they agreed.  -Admit inpatient  -Consult neurology  -Given aspirin 325 mg at admission, aspirin 81 mg daily indefinitely  -Loaded with clopidogrel 300 mg x 1, clopidogrel 75 mg daily for 21 days thereafter  -Continue rosuvastatin 40 mg  -Permissive hypertension, labetalol IV as needed for SBP greater than 220, DBP greater than 120, hold for HR less than 70  -Hold home metoprolol  -Patient may benefit from repeat MIKE given cryptogenic nature of her ischemic strokes in different vascular distributions at a young age  -Outpatient hypercoagulable work-up  -Ammonia, ESR, CRP, TSH, hemoglobin A1c, lipid panel, UDS  -Daily labs    #Hypokalemia:  -Replace and trend  -Check magnesium    #HTN: Hold metoprolol  #HLD: As above  #MDD with [facility]: Continue amitriptyline, escitalopram, clonazepam    Full Code    Inpatient for above    Enoxaparin for VTE prevention    Please call [phone] for questions or concerns

## 2020-06-08 NOTE — PROGRESS NOTES
Care Management Interventions  Transition of Care Consult (CM Consult): Discharge Planning  Physical Therapy Consult: Yes  Occupational Therapy Consult: Yes  Speech Therapy Consult: Yes  Current Support Network: Own Home  Confirm Follow Up Transport: Family  The Patient and/or Patient Representative was Provided with a Choice of Provider and Agrees with the Discharge Plan?: Yes  Discharge Location  Discharge Placement: Home with outpatient services       MADDIE received call back from [facility] confirming they received referral & Noreen ( [phone] ) will f/u. Noreen will call pt directly to schedule appt. MADDIE called [facility] to confirm they received clinical & order that was faxed. MADDIE left VM message for [facility]ara at [phone] requesting call back. Referral per MD.  Pt admitted to r/o TIA vs CVA. MADDIE spoke with pt who is A&O, indep with ADL'S. Pt lives in her own home with 26 y/o son. Pt is employed as a . Pt denies any deficits with walking or using her upper extremities. PT evaluated pt & discharged without any needs. Pt states only deficits are with speech and speech evaluation confirms. Pt states this happened last yr and she had daily speech therapy at [facility]. Pt states her PCP is Boris Wilde MD with [address] Pt request referral to [facility]. MADDIE called [facility] ( [phone] ) spoke with Gonsalo Lara who states referral needs to be faxed to [facility] ( [phone] ). MADDIE faxed clinical & order for out ST. Will await call back with appt.

## 2020-06-08 NOTE — PROGRESS NOTES
Problem: Self Care Deficits Care Plan (Adult)  Goal: *Acute Goals and Plan of Care (Insert Text)  Description: Patient will complete lower body dressing with independence to increase self care independence. -GOAL MET 6/8/2020   Patient will complete toileting with independence to increase self care independence. -GOAL MET 6/8/2020   Patient will complete all functional transfers with independence using adaptive equipment as needed. -GOAL MET 6/8/2020     Timeframe: Eval, tx, and d/c       OCCUPATIONAL THERAPY: Initial Assessment, Daily Note, and Discharge 6/8/2020  INPATIENT: OT Visit Days: 1  Payor: Frances Yarbrough / Plan: [address] / Product Type: PPO /      NAME/AGE/GENDER: Zeeshan Garcia is a 52 y.o. female   PRIMARY DIAGNOSIS:  Expressive aphasia [R47.01] Expressive aphasia Expressive aphasia        ICD-10: Treatment Diagnosis:    Other lack of cordination (R27.8)   Precautions/Allergies:     Patient has no known allergies. ASSESSMENT:     Ms. Paxton Quijano presents with left parietal infarct. She had an infarct with hemorrhagic conversion about 1 year ago 7/2019 with 5 weeks of rehab and full recovery. Still works as a teacher. Came to the hospital with speech difficulties which have significantly improved over night. Her ADL's, mobility, perception, [address], and UE strength are all normal.   Some minor difficulty with tracking laterally with the right eye, but naturally corrects, she reports no symptoms associated with it. D/C OT for acute deficits. This section established at most recent assessment   PROBLEM LIST (Impairments causing functional limitations):     INTERVENTIONS PLANNED: (Benefits and precautions of occupational therapy have been discussed with the patient.)       TREATMENT PLAN: Frequency/Duration: Follow patient evalu, tx and d/c to address above goals.   Rehabilitation Potential For Stated Goals: Excellent     REHAB RECOMMENDATIONS (at time of discharge pending progress):    Placement: It is my opinion, based on this patient's performance to date, that Ms. Deweil may benefit from being discharged with NO further skilled therapy due to all goals being met. Equipment:   None at this time              OCCUPATIONAL PROFILE AND HISTORY:   History of Present Injury/Illness (Reason for Referral):  See H&P  Past Medical History/Comorbidities:   Ms. Mathew Barnett  has a past medical history of Psychiatric disorder. Ms. Mathew Barnett  has a past surgical history that includes hx gyn; hx breast augmentation; and implant breast silicone/eq. Social History/Living Environment:   Home Environment: Private residence  # Steps to Enter: 2  Rails to Enter: No  One/Two Story Residence: Two story  # of Interior Steps: 12  Interior Rails: Left  Living Alone: No  Support Systems: Family member(s)  Patient Expects to be Discharged to[de-identified] Private residence  Current DME Used/Available at Home: None  Prior Level of Function/Work/Activity:  Independent. Not driving. Working as a teacher. Number of Personal Factors/Comorbidities that affect the Plan of Care: Brief history (0):  LOW COMPLEXITY   ASSESSMENT OF OCCUPATIONAL PERFORMANCE[de-identified]   Activities of Daily Living:   Basic ADLs (From Assessment) Complex ADLs (From Assessment)   Feeding: Independent  Oral Facial Hygiene/Grooming: Independent  Bathing: Independent  Upper Body Dressing: Independent  Lower Body Dressing: Independent     Grooming/Bathing/Dressing Activities of Daily Living     Cognitive Retraining  Safety/Judgement: Awareness of environment; Fall prevention                 Functional Transfers  Bathroom Mobility: Independent  Toilet Transfer : Independent     Bed/Mat Mobility  Rolling: Independent  Supine to Sit: Independent  Sit to Supine: Independent  Sit to Stand: Independent  Stand to Sit: Independent  Bed to Chair: Independent  Scooting: Independent     Most Recent Physical Functioning:   Gross Assessment:                  Posture: Balance:  Sitting: Intact  Standing: Intact Bed Mobility:  Rolling: Independent  Supine to Sit: Independent  Sit to Supine: Independent  Scooting: Independent  Wheelchair Mobility:     Transfers:  Sit to Stand: Independent  Stand to Sit: Independent  Bed to Chair: Independent            Patient Vitals for the past 6 hrs:   BP BP Patient Position SpO2 Pulse   20 0721 109/63 At rest 98 % 84   20 0808 -- -- 93 % --   20 1113 122/74 At rest 95 % 92       Mental Status  Neurologic State: Alert  Orientation Level: Oriented X4  Cognition: Follows commands  Perception: Appears intact  Perseveration: No perseveration noted  Safety/Judgement: Awareness of environment, Fall prevention            LLE Assessment  LLE Assessment (WDL): Within defined limits RLE Assessment  RLE Assessment (WDL): Within defined limits           Physical Skills Involved:  Vision Cognitive Skills Affected (resulting in the inability to perform in a timely and safe manner):  Expression Psychosocial Skills Affected:  WFL    Number of elements that affect the Plan of Care: 1-3:  LOW COMPLEXITY   CLINICAL DECISION MAKIN[address] [place] [address] AM-PAC 6 Clicks   Daily Activity Inpatient Short Form  How much help from another person does the patient currently need. .. Total A Lot A Little None   1. Putting on and taking off regular lower body clothing? [] 1   [] 2   [] 3   [x] 4   2. Bathing (including washing, rinsing, drying)? [] 1   [] 2   [] 3   [x] 4   3. Toileting, which includes using toilet, bedpan or urinal?   [] 1   [] 2   [] 3   [x] 4   4. Putting on and taking off regular upper body clothing? [] 1   [] 2   [] 3   [x] 4   5. Taking care of personal grooming such as brushing teeth? [] 1   [] 2   [] 3   [x] 4   6. Eating meals? [] 1   [] 2   [] 3   [x] 4   © , Trustees of [address] [place] [address], under license to [employer].  All rights reserved      Score:  Initial: 24 Most Recent: X (Date: -- ) Interpretation of Tool:  Represents activities that are increasingly more difficult (i.e. Bed mobility, Transfers, Gait). Use of outcome tool(s) and clinical judgement create a POC that gives a: LOW COMPLEXITY         TREATMENT:   (In addition to Assessment/Re-Assessment sessions the following treatments were rendered)     Pre-treatment Symptoms/Complaints:    Pain: Initial:   Pain Intensity 1: 0  Post Session:  0     Self Care: (8): Procedure(s) (per grid) utilized to improve and/or restore self-care/home management as related to dressing, bathing, and grooming. Required no tactile cueing to facilitate activities of daily living skills. Initial evlauation 12 mintues. Braces/Orthotics/Lines/Etc:   O2 Device: Room air  Treatment/Session Assessment:    Response to Treatment:  Good.   Interdisciplinary Collaboration:   Physical Therapist  Occupational Therapist  After treatment position/precautions:   Up in chair  Call light within reach     Total Treatment Duration:  OT Patient Time In/Time Out  Time In: 1000  Time Out: [address], OT

## 2020-06-08 NOTE — PROGRESS NOTES
Problem: Mobility Impaired (Adult and Pediatric)  Goal: *Acute Goals and Plan of Care (Insert Text)  Description: ALL GOALS MET  6/7/20 :  (1.)Ms. Deweil will move from supine to sit and sit to supine  with INDEPENDENT. (2.)Ms. Deweil will transfer from bed to chair and chair to bed with INDEPENDENT. (3.)Ms. Deweil will ambulate with INDEPENDENT for 200 feet. 4) pt able to go up & down 2 steps no rail & 12 steps & left rail INDEPENDENTLY. ________________________________________________________________________________________________   Outcome: Resolved/Met     PHYSICAL THERAPY: Initial Assessment and AM 6/8/2020  INPATIENT: PT Visit Days : 1  Payor: Anna Mccracken / Plan: [place] [employer] [facility] / Product Type: PPO /       NAME/AGE/GENDER: Gricel Merchant is a 52 y.o. female   PRIMARY DIAGNOSIS: Expressive aphasia [R47.01] Expressive aphasia Expressive aphasia        ICD-10: Treatment Diagnosis:    Generalized Muscle Weakness (M62.81)  Other lack of cordination (R27.8)   Precaution/Allergies:  Patient has no known allergies. ASSESSMENT:     Ms. Mathew Barnett presents with no neuromuscular or functional deficits. Speech was clear & appropriate but being slow with frequent pauses. Pt is functionally independent but needs to follow up with ST. No PT follow up. This section established at most recent assessment   PROBLEM LIST (Impairments causing functional limitations):  NA    INTERVENTIONS PLANNED: (Benefits and precautions of physical therapy have been discussed with the patient.)  NA      TREATMENT PLAN: Frequency/Duration: NA  Rehabilitation Potential For Stated Goals: NA      REHAB RECOMMENDATIONS (at time of discharge pending progress):    Placement: It is my opinion, based on this patient's performance to date, that Ms. Deweil may benefit from being discharged with NO further skilled therapy due to a proven ability to function at baseline.   Equipment:   None at this time              HISTORY: History of Present Injury/Illness (Reason for Referral):  80-year-old female presenting [facility] emergency department 6/7/2020 with a complaint of difficulty finding words and confusion since approximately noon of 6/6/2020. 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 The etiology of the stroke has not been determined. Patient has had a loop recorder for approximately 1 year without atrial fibrillation or atrial flutter. Patient had a transesophageal echocardiogram in July 2019 at [facility] that showed no PFO or thrombus in left atrial appendage. Patient completed a course of 21 days of dual antiplatelet therapy after the stroke in 2019. Patient has been on home aspirin 81 mg and atorvastatin 80 mg and states that she has been compliant with these medications. Patient denies headache, fever, chills, nausea, vomiting, diarrhea, chest pain, shortness of breath. In the emergency department the patient was noted to have normal vital signs. Patient was profoundly aphasic and stat tele-neuro consult was ordered. Patient was out of window for TPA. Interventional neurosurgery was contacted for possible thrombectomy. Interventional neurosurgeon recommended that the patient had a small distal left MCA occlusion that was not amenable to intervention. CT head unremarkable. CT angiogram head and neck read by radiologist as having no occlusive disease. CT perfusion scan indicated a small area of delayed perfusion in the left posterior frontal lobe with no core infarction. MRI brain showing acute left parietal stroke. The patient was given aspirin 325 mg in the emergency department. Past Medical History/Comorbidities:   Ms. Isabela Huang  has a past medical history of Psychiatric disorder. Ms. Isabela Huang  has a past surgical history that includes hx gyn; hx breast augmentation; and implant breast silicone/eq.   Social History/Living Environment:   Home Environment: Private residence  # Steps to Enter: 2  Rails to [employer]: No  One/Two Story Residence: Two story  # of Interior Steps: 12  Interior Rails: Left  Living Alone: No  Support Systems: Child(lisandra), Family member(s)  Patient Expects to be Discharged to[de-identified] Private residence  Current DME Used/Available at Home: None  Prior Level of Function/Work/Activity:  Pt was independent without an assistive device prior to this admission. Personal Factors: Other factors that influence how disability is experienced by the patient:  current & PMH   Number of Personal Factors/Comorbidities that affect the Plan of Care: 1-2: MODERATE COMPLEXITY   EXAMINATION:   Most Recent Physical Functioning:   Gross Assessment:  AROM: Within functional limits(all limbs & core)  Strength: Within functional limits(all limbs & core)  Coordination: Within functional limits(all limbs & core)                    Balance:  Sitting: Intact; Without support  Standing: Intact; Without support Bed Mobility:  Supine to Sit: Independent  Sit to Supine: Independent  Scooting: Independent       Transfers:  Sit to Stand: Independent  Stand to Sit: Independent  Bed to Chair: Independent  Gait:     Speed/Josee: Fluctuations  Gait Abnormalities: Decreased step clearance  Distance (ft): 400 Feet (ft)  Assistive Device: (none)  Ambulation - Level of Assistance: Independent  Number of Stairs Trained: 15(2 steps no rail independent)  Stairs - Level of Assistance: Independent  Rail Use: Left    Functional Mobility:         Gait/Ambulation:  Ind        Transfers:  Ind        Bed Mobility:  Ind        Stairs:  Ind   Body Structures Involved:  Voice/Speech  Muscles Body Functions Affected:  Voice and Speech  Movement Related Activities and Participation Affected:  General Tasks and Demands  Mobility   Number of elements that affect the Plan of Care: 4+: HIGH COMPLEXITY   CLINICAL PRESENTATION:   Presentation: Stable and uncomplicated: LOW COMPLEXITY   CLINICAL DECISION MAKIN[address] [place] [address] AM-PAC 6 Clicks   Basic Mobility Inpatient Short Form  How much difficulty does the patient currently have. .. Unable A Lot A Little None   1. Turning over in bed (including adjusting bedclothes, sheets and blankets)? [] 1   [] 2   [] 3   [x] 4   2. Sitting down on and standing up from a chair with arms ( e.g., wheelchair, bedside commode, etc.)   [] 1   [] 2   [] 3   [x] 4   3. Moving from lying on back to sitting on the side of the bed? [] 1   [] 2   [] 3   [x] 4   How much help from another person does the patient currently need. .. Total A Lot A Little None   4. Moving to and from a bed to a chair (including a wheelchair)? [] 1   [] 2   [] 3   [x] 4   5. Need to walk in hospital room? [] 1   [] 2   [] 3   [x] 4   6. Climbing 3-5 steps with a railing? [] 1   [] 2   [] 3   [x] 4   © , Trustees of [address] [place] [address], under license to [employer]. All rights reserved      Score:  Initial: 24 Most Recent: X (Date: -- )    Interpretation of Tool:  Represents activities that are increasingly more difficult (i.e. Bed mobility, Transfers, Gait). Medical Necessity:     No PT follow up. Reason for Services/Other Comments:  No PT follow up.    Use of outcome tool(s) and clinical judgement create a POC that gives a: Clear prediction of patient's progress: LOW COMPLEXITY            TREATMENT:   (In addition to Assessment/Re-Assessment sessions the following treatments were rendered)   Pre-treatment Symptoms/Complaints:  \" my speech is better \"  Pain: Initial: numeric scale  Pain Intensity 1: 0  Post Session: 0/10     Assessment/Reassessment only, no treatment provided today    Braces/Orthotics/Lines/Etc:   none   Treatment/Session Assessment:    Response to Treatment:  tolerated well  Interdisciplinary Collaboration:   Registered Nurse  After treatment position/precautions:   Up in chair  Bed/Chair-wheels locked  Call light within reach  RN notified   Compliance with Program/Exercises: Compliant all of the time  Recommendations/ DC PT services  Total Treatment Duration:  PT Patient Time In/Time Out  Time In: 5102  Time Out: [address] PT

## 2020-06-09 LAB — PATH REV BLD -IMP: NORMAL

## 2020-06-10 LAB
ANA SER QL: NEGATIVE
AT III AG PPP IA-ACNC: 92 % (ref 72–124)
AT III PPP CHRO-ACNC: 104 % (ref 75–135)
CARDIOLIPIN IGA SER IA-ACNC: <9 APL U/ML (ref 0–11)
CARDIOLIPIN IGG SER IA-ACNC: <9 GPL U/ML (ref 0–14)
CARDIOLIPIN IGM SER IA-ACNC: <9 MPL U/ML (ref 0–12)
HCYS SERPL-SCNC: 8.2 UMOL/L (ref 0–14.5)
PROT C AG PPP IA-ACNC: 114 % (ref 60–150)
PROT S ACT/NOR PPP: 94 % (ref 63–140)
PROT S AG ACT/NOR PPP IA: 98 % (ref 60–150)
PROT S FREE AG ACT/NOR PPP IA: 93 % (ref 57–157)

## 2020-06-15 LAB — F5 GENE MUT ANL BLD/T: NORMAL

## 2020-06-16 LAB — F2 GENE MUT ANL BLD/T: NORMAL

## 2020-06-19 ENCOUNTER — HOSPITAL ENCOUNTER (OUTPATIENT)
Dept: LAB | Age: 50
Discharge: HOME | End: 2020-06-19
Payer: COMMERCIAL

## 2020-06-19 DIAGNOSIS — I63.9 CRYPTOGENIC STROKE (HCC): ICD-10-CM

## 2020-06-19 DIAGNOSIS — I63.441 EMBOLIC STROKE INVOLVING RIGHT CEREBELLAR ARTERY (HCC): ICD-10-CM

## 2020-06-19 LAB
BASOPHILS # BLD: 0.1 K/UL (ref 0–0.2)
BASOPHILS NFR BLD: 1 % (ref 0–2)
DIFFERENTIAL METHOD BLD: ABNORMAL
EOSINOPHIL # BLD: 0.1 K/UL (ref 0–0.8)
EOSINOPHIL NFR BLD: 1 % (ref 0.5–7.8)
ERYTHROCYTE [DISTWIDTH] IN BLOOD BY AUTOMATED COUNT: 12.8 % (ref 11.9–14.6)
HCT VFR BLD AUTO: 47.2 % (ref 35.8–46.3)
HGB BLD-MCNC: 15.7 G/DL (ref 11.7–15.4)
IMM GRANULOCYTES # BLD AUTO: 0 K/UL (ref 0–0.5)
IMM GRANULOCYTES NFR BLD AUTO: 0 % (ref 0–5)
LYMPHOCYTES # BLD: 1.4 K/UL (ref 0.5–4.6)
LYMPHOCYTES NFR BLD: 21 % (ref 13–44)
Lab: NORMAL
MCH RBC QN AUTO: 32.6 PG (ref 26.1–32.9)
MCHC RBC AUTO-ENTMCNC: 33.3 G/DL (ref 31.4–35)
MCV RBC AUTO: 97.9 FL (ref 79.6–97.8)
MONOCYTES # BLD: 0.6 K/UL (ref 0.1–1.3)
MONOCYTES NFR BLD: 9 % (ref 4–12)
NEUTS SEG # BLD: 4.6 K/UL (ref 1.7–8.2)
NEUTS SEG NFR BLD: 68 % (ref 43–78)
NRBC # BLD: 0 K/UL (ref 0–0.2)
PLATELET # BLD AUTO: 252 K/UL (ref 150–450)
PMV BLD AUTO: 8.9 FL (ref 9.4–12.3)
RBC # BLD AUTO: 4.82 M/UL (ref 4.05–5.25)
REFERENCE LAB,REFLB: NORMAL
TEST DESCRIPTION:,ATST: NORMAL
WBC # BLD AUTO: 6.7 K/UL (ref 4.3–11.1)

## 2020-06-19 PROCEDURE — 85025 COMPLETE CBC W/AUTO DIFF WBC: CPT

## 2020-06-19 PROCEDURE — 85732 THROMBOPLASTIN TIME PARTIAL: CPT

## 2020-06-19 PROCEDURE — 88184 FLOWCYTOMETRY/ TC 1 MARKER: CPT

## 2020-06-19 PROCEDURE — 85300 ANTITHROMBIN III ACTIVITY: CPT

## 2020-06-19 PROCEDURE — 86146 BETA-2 GLYCOPROTEIN ANTIBODY: CPT

## 2020-06-19 PROCEDURE — 85613 RUSSELL VIPER VENOM DILUTED: CPT

## 2020-06-19 PROCEDURE — 36415 COLL VENOUS BLD VENIPUNCTURE: CPT

## 2020-06-19 PROCEDURE — 86147 CARDIOLIPIN ANTIBODY EA IG: CPT

## 2020-06-19 PROCEDURE — 85307 ASSAY ACTIVATED PROTEIN C: CPT

## 2020-06-19 PROCEDURE — 85230 CLOT FACTOR VII PROCONVERTIN: CPT

## 2020-06-19 PROCEDURE — 85305 CLOT INHIBIT PROT S TOTAL: CPT

## 2020-06-19 PROCEDURE — 85730 THROMBOPLASTIN TIME PARTIAL: CPT

## 2020-06-19 PROCEDURE — 88185 FLOWCYTOMETRY/TC ADD-ON: CPT

## 2020-06-19 PROCEDURE — 85240 CLOT FACTOR VIII AHG 1 STAGE: CPT

## 2020-06-19 PROCEDURE — 85302 CLOT INHIBIT PROT C ANTIGEN: CPT

## 2020-06-19 PROCEDURE — 83090 ASSAY OF HOMOCYSTEINE: CPT

## 2020-06-19 PROCEDURE — 85598 HEXAGNAL PHOSPH PLTLT NEUTRL: CPT

## 2020-06-22 PROBLEM — I63.9 CRYPTOGENIC STROKE (HCC): Status: ACTIVE | Noted: 2020-06-22

## 2020-06-23 LAB — PATH REV BLD -IMP: NORMAL

## 2020-06-24 LAB — MTHFR GENE MUT ANL BLD/T: NORMAL

## 2020-06-26 LAB
FLOW CYTOMETRY, FBTC1: NORMAL
SPECIMEN SOURCE: NORMAL
TEST ORDERED:: NORMAL

## 2020-07-03 LAB
Lab: NORMAL
REFERENCE LAB,REFLB: NORMAL
TEST DESCRIPTION:,ATST: NORMAL

## 2020-08-07 ENCOUNTER — HOSPITAL ENCOUNTER (OUTPATIENT)
Dept: MAMMOGRAPHY | Age: 50
Discharge: HOME | End: 2020-08-07
Attending: INTERNAL MEDICINE
Payer: COMMERCIAL

## 2020-08-07 DIAGNOSIS — Z12.31 BREAST CANCER SCREENING BY MAMMOGRAM: ICD-10-CM

## 2020-08-07 DIAGNOSIS — R92.2 DENSE BREAST TISSUE ON MAMMOGRAM: ICD-10-CM

## 2020-08-07 PROCEDURE — 77063 BREAST TOMOSYNTHESIS BI: CPT

## 2020-08-22 PROBLEM — Z12.31 BREAST CANCER SCREENING BY MAMMOGRAM: Status: ACTIVE | Noted: 2020-08-22

## 2020-09-21 PROBLEM — Z12.31 BREAST CANCER SCREENING BY MAMMOGRAM: Status: RESOLVED | Noted: 2020-08-22 | Resolved: 2020-09-21

## 2022-02-11 ENCOUNTER — TRANSCRIBE ORDER (OUTPATIENT)
Dept: SCHEDULING | Age: 52
End: 2022-02-11

## 2022-02-11 DIAGNOSIS — Z12.31 SCREENING MAMMOGRAM FOR BREAST CANCER: Primary | ICD-10-CM

## 2022-03-18 PROBLEM — R47.01 EXPRESSIVE APHASIA: Status: ACTIVE | Noted: 2020-06-07

## 2022-03-19 PROBLEM — I63.9 CRYPTOGENIC STROKE (HCC): Status: ACTIVE | Noted: 2020-06-22

## 2022-03-19 PROBLEM — F32.9 MDD (MAJOR DEPRESSIVE DISORDER): Status: ACTIVE | Noted: 2020-06-07

## 2022-03-19 PROBLEM — F41.1 GAD (GENERALIZED ANXIETY DISORDER): Status: ACTIVE | Noted: 2020-06-07

## 2022-03-19 PROBLEM — I63.412 EMBOLIC STROKE INVOLVING LEFT MIDDLE CEREBRAL ARTERY (HCC): Status: ACTIVE | Noted: 2020-06-08

## 2022-03-19 PROBLEM — E78.5 HLD (HYPERLIPIDEMIA): Status: ACTIVE | Noted: 2020-06-07

## 2022-03-19 PROBLEM — E87.6 HYPOKALEMIA: Status: ACTIVE | Noted: 2020-06-07

## 2022-03-19 PROBLEM — I63.441 EMBOLIC STROKE INVOLVING RIGHT CEREBELLAR ARTERY (HCC): Status: ACTIVE | Noted: 2020-06-07

## 2022-03-20 PROBLEM — I10 HTN (HYPERTENSION): Status: ACTIVE | Noted: 2020-06-07

## 2022-04-12 ENCOUNTER — HOSPITAL ENCOUNTER (OUTPATIENT)
Dept: CT IMAGING | Age: 52
Discharge: HOME | End: 2022-04-12
Attending: NURSE PRACTITIONER

## 2022-04-12 DIAGNOSIS — N39.0 RECURRENT UTI: ICD-10-CM

## 2022-04-12 DIAGNOSIS — R31.29 MICROHEMATURIA: ICD-10-CM

## 2022-04-12 RX ORDER — SODIUM CHLORIDE 0.9 % (FLUSH) 0.9 %
10 SYRINGE (ML) INJECTION
Status: COMPLETED | OUTPATIENT
Start: 2022-04-12 | End: 2022-04-12

## 2022-04-12 RX ADMIN — Medication 10 ML: at 15:24

## 2022-04-12 NOTE — PROGRESS NOTES
CT showed no reason for no stones, masses/tumors, or obstruction. No reasons for the blood in the urine. Follow up as scheduled for cysto.

## 2022-04-13 NOTE — PROGRESS NOTES
Called and spk with patient-advised of CT WNL-due to Sinus infection patient requested we reschedule Cysto (4/14/22) for sometime next week.  Cysto rescheduled for 4/18/22-patient aware, agreeable and voiced understanding

## 2022-07-26 ENCOUNTER — HOSPITAL ENCOUNTER (OUTPATIENT)
Dept: MAMMOGRAPHY | Age: 52
Discharge: HOME | End: 2022-07-29
Payer: COMMERCIAL

## 2022-07-26 DIAGNOSIS — Z12.31 ENCOUNTER FOR SCREENING MAMMOGRAM FOR MALIGNANT NEOPLASM OF BREAST: ICD-10-CM

## 2022-07-26 PROCEDURE — 77063 BREAST TOMOSYNTHESIS BI: CPT

## 2024-01-05 ENCOUNTER — TELEPHONE (OUTPATIENT)
Dept: UROLOGY | Age: 54
End: 2024-01-05

## 2024-01-05 NOTE — TELEPHONE ENCOUNTER
Pt called stating they saw clayton hawkins back in November and that clayton sent an order for testing to be done and has been waiting on a call from our office to schedule a CT, Cystoscopy, and Cytology. I do not see any notes for this. Pt would like a call back at [phone]

## 2024-01-22 ENCOUNTER — PROCEDURE VISIT (OUTPATIENT)
Dept: UROLOGY | Age: 54
End: 2024-01-22
Payer: COMMERCIAL

## 2024-01-22 DIAGNOSIS — R31.29 MICROSCOPIC HEMATURIA: ICD-10-CM

## 2024-01-22 DIAGNOSIS — N39.41 URGE INCONTINENCE: Primary | ICD-10-CM

## 2024-01-22 LAB
BILIRUBIN, URINE, POC: NEGATIVE
BLOOD URINE, POC: NORMAL
GLUCOSE URINE, POC: NEGATIVE
KETONES, URINE, POC: NEGATIVE
LEUKOCYTE ESTERASE, URINE, POC: NEGATIVE
NITRITE, URINE, POC: NEGATIVE
PH, URINE, POC: 6 (ref 4.6–8)
PROTEIN,URINE, POC: NEGATIVE
SPECIFIC GRAVITY, URINE, POC: 1.02 (ref 1–1.03)
URINALYSIS CLARITY, POC: NORMAL
URINALYSIS COLOR, POC: NORMAL
UROBILINOGEN, POC: NORMAL

## 2024-01-22 PROCEDURE — 52000 CYSTOURETHROSCOPY: CPT | Performed by: UROLOGY

## 2024-01-22 PROCEDURE — 81003 URINALYSIS AUTO W/O SCOPE: CPT | Performed by: UROLOGY

## 2024-01-22 NOTE — PROGRESS NOTES
DIP STICK AUTO W/O MICRO      2. Microscopic hematuria  R31.29         Orders Placed This Encounter   Procedures    CYSTOURETHROSCOPY    AMB POC URINALYSIS DIP STICK AUTO W/O MICRO       Cysto without source of hematuria today.  Will get CT hematuria with nP Paulson to complete work up.     Hematuria and LUTS possibly due to atrophic vaginitis or IC if CT hematuria does not show obvious source.     Wlil follow up with us PRN and continue hematuria work up / incontinence management with ULISES Paulson.     Johnathan Craven M.D.    [facility]  [address] [address]  Phone: [phone]  Fax: [phone]